# Patient Record
Sex: FEMALE | Race: WHITE | NOT HISPANIC OR LATINO | Employment: OTHER | URBAN - METROPOLITAN AREA
[De-identification: names, ages, dates, MRNs, and addresses within clinical notes are randomized per-mention and may not be internally consistent; named-entity substitution may affect disease eponyms.]

---

## 2017-02-27 ENCOUNTER — ALLSCRIPTS OFFICE VISIT (OUTPATIENT)
Dept: OTHER | Facility: OTHER | Age: 82
End: 2017-02-27

## 2017-04-02 ENCOUNTER — TELEPHONE (OUTPATIENT)
Dept: INPATIENT UNIT | Facility: HOSPITAL | Age: 82
End: 2017-04-02

## 2017-04-03 ENCOUNTER — HOSPITAL ENCOUNTER (OUTPATIENT)
Dept: NON INVASIVE DIAGNOSTICS | Facility: HOSPITAL | Age: 82
Discharge: HOME | End: 2017-04-03
Attending: INTERNAL MEDICINE | Admitting: INTERNAL MEDICINE
Payer: MEDICARE

## 2017-04-03 ENCOUNTER — ANESTHESIA (OUTPATIENT)
Dept: SURGERY | Facility: HOSPITAL | Age: 82
End: 2017-04-03
Payer: MEDICARE

## 2017-04-03 ENCOUNTER — ANESTHESIA EVENT (OUTPATIENT)
Dept: SURGERY | Facility: HOSPITAL | Age: 82
End: 2017-04-03
Payer: MEDICARE

## 2017-04-03 VITALS
SYSTOLIC BLOOD PRESSURE: 166 MMHG | WEIGHT: 105 LBS | BODY MASS INDEX: 20.62 KG/M2 | OXYGEN SATURATION: 96 % | TEMPERATURE: 97.3 F | RESPIRATION RATE: 18 BRPM | HEART RATE: 102 BPM | HEIGHT: 60 IN | DIASTOLIC BLOOD PRESSURE: 74 MMHG

## 2017-04-03 DIAGNOSIS — Z45.010 PACEMAKER GENERATOR END OF LIFE: ICD-10-CM

## 2017-04-03 LAB
ANION GAP SERPL CALCULATED.3IONS-SCNC: 7 MMOL/L (ref 4–13)
ATRIAL RATE: 277 BPM
ATRIAL RATE: 85 BPM
BUN SERPL-MCNC: 37 MG/DL (ref 5–25)
CALCIUM SERPL-MCNC: 9.5 MG/DL (ref 8.3–10.1)
CHLORIDE SERPL-SCNC: 111 MMOL/L (ref 100–108)
CO2 SERPL-SCNC: 23 MMOL/L (ref 21–32)
CREAT SERPL-MCNC: 1.44 MG/DL (ref 0.6–1.3)
ERYTHROCYTE [DISTWIDTH] IN BLOOD BY AUTOMATED COUNT: 15.2 % (ref 11.6–15.1)
GFR SERPL CREATININE-BSD FRML MDRD: 33.5 ML/MIN/1.73SQ M
GLUCOSE P FAST SERPL-MCNC: 98 MG/DL (ref 65–99)
GLUCOSE SERPL-MCNC: 98 MG/DL (ref 65–140)
HCT VFR BLD AUTO: 37.9 % (ref 34.8–46.1)
HGB BLD-MCNC: 12.9 G/DL (ref 11.5–15.4)
INR PPP: 1.08 (ref 0.86–1.16)
MCH RBC QN AUTO: 33.2 PG (ref 26.8–34.3)
MCHC RBC AUTO-ENTMCNC: 34 G/DL (ref 31.4–37.4)
MCV RBC AUTO: 97 FL (ref 82–98)
PLATELET # BLD AUTO: 189 THOUSANDS/UL (ref 149–390)
PMV BLD AUTO: 10.9 FL (ref 8.9–12.7)
POTASSIUM SERPL-SCNC: 4.5 MMOL/L (ref 3.5–5.3)
POTASSIUM SERPL-SCNC: 5.5 MMOL/L (ref 3.5–5.3)
PROTHROMBIN TIME: 14.1 SECONDS (ref 12–14.3)
QRS AXIS: 16 DEGREES
QRS AXIS: 22 DEGREES
QRSD INTERVAL: 88 MS
QRSD INTERVAL: 88 MS
QT INTERVAL: 380 MS
QT INTERVAL: 388 MS
QTC INTERVAL: 459 MS
QTC INTERVAL: 469 MS
RBC # BLD AUTO: 3.89 MILLION/UL (ref 3.81–5.12)
SODIUM SERPL-SCNC: 141 MMOL/L (ref 136–145)
T WAVE AXIS: 257 DEGREES
T WAVE AXIS: 259 DEGREES
VENTRICULAR RATE: 88 BPM
VENTRICULAR RATE: 88 BPM
WBC # BLD AUTO: 4.27 THOUSAND/UL (ref 4.31–10.16)

## 2017-04-03 PROCEDURE — 84132 ASSAY OF SERUM POTASSIUM: CPT | Performed by: PHYSICIAN ASSISTANT

## 2017-04-03 PROCEDURE — 85027 COMPLETE CBC AUTOMATED: CPT | Performed by: PHYSICIAN ASSISTANT

## 2017-04-03 PROCEDURE — 33227 REMOVE&REPLACE PM GEN SINGL: CPT | Performed by: INTERNAL MEDICINE

## 2017-04-03 PROCEDURE — 33228 REMV&REPLC PM GEN DUAL LEAD: CPT | Performed by: INTERNAL MEDICINE

## 2017-04-03 PROCEDURE — 93005 ELECTROCARDIOGRAM TRACING: CPT

## 2017-04-03 PROCEDURE — C1785 PMKR, DUAL, RATE-RESP: HCPCS

## 2017-04-03 PROCEDURE — 85610 PROTHROMBIN TIME: CPT | Performed by: PHYSICIAN ASSISTANT

## 2017-04-03 PROCEDURE — 80048 BASIC METABOLIC PNL TOTAL CA: CPT | Performed by: PHYSICIAN ASSISTANT

## 2017-04-03 RX ORDER — GENTAMICIN SULFATE 40 MG/ML
INJECTION, SOLUTION INTRAMUSCULAR; INTRAVENOUS CODE/TRAUMA/SEDATION MEDICATION
Status: COMPLETED | OUTPATIENT
Start: 2017-04-03 | End: 2017-04-03

## 2017-04-03 RX ORDER — LEVOTHYROXINE SODIUM 0.07 MG/1
75 TABLET ORAL DAILY
COMMUNITY

## 2017-04-03 RX ORDER — TRIAMTERENE AND HYDROCHLOROTHIAZIDE 37.5; 25 MG/1; MG/1
1 CAPSULE ORAL DAILY
COMMUNITY
End: 2017-09-20 | Stop reason: HOSPADM

## 2017-04-03 RX ORDER — AMLODIPINE BESYLATE 5 MG/1
5 TABLET ORAL DAILY
COMMUNITY

## 2017-04-03 RX ORDER — LIDOCAINE HYDROCHLORIDE 10 MG/ML
INJECTION, SOLUTION INFILTRATION; PERINEURAL CODE/TRAUMA/SEDATION MEDICATION
Status: COMPLETED | OUTPATIENT
Start: 2017-04-03 | End: 2017-04-03

## 2017-04-03 RX ORDER — ALLOPURINOL 100 MG/1
100 TABLET ORAL DAILY
COMMUNITY

## 2017-04-03 RX ORDER — PANTOPRAZOLE SODIUM 40 MG/1
40 TABLET, DELAYED RELEASE ORAL DAILY
COMMUNITY

## 2017-04-03 RX ORDER — PROPOFOL 10 MG/ML
INJECTION, EMULSION INTRAVENOUS CONTINUOUS PRN
Status: DISCONTINUED | OUTPATIENT
Start: 2017-04-03 | End: 2017-04-03 | Stop reason: SURG

## 2017-04-03 RX ORDER — LIDOCAINE HYDROCHLORIDE 10 MG/ML
INJECTION, SOLUTION INFILTRATION; PERINEURAL AS NEEDED
Status: DISCONTINUED | OUTPATIENT
Start: 2017-04-03 | End: 2017-04-03 | Stop reason: SURG

## 2017-04-03 RX ORDER — SODIUM CHLORIDE 9 MG/ML
50 INJECTION, SOLUTION INTRAVENOUS CONTINUOUS
Status: DISCONTINUED | OUTPATIENT
Start: 2017-04-03 | End: 2017-04-03 | Stop reason: HOSPADM

## 2017-04-03 RX ORDER — METOPROLOL TARTRATE 50 MG/1
50 TABLET, FILM COATED ORAL 2 TIMES DAILY
COMMUNITY
End: 2017-09-20 | Stop reason: HOSPADM

## 2017-04-03 RX ADMIN — GENTAMICIN SULFATE 80 MG: 40 INJECTION, SOLUTION INTRAMUSCULAR; INTRAVENOUS at 14:58

## 2017-04-03 RX ADMIN — CEFAZOLIN SODIUM 1000 MG: 2 SOLUTION INTRAVENOUS at 14:34

## 2017-04-03 RX ADMIN — PROPOFOL 60 MCG/KG/MIN: 10 INJECTION, EMULSION INTRAVENOUS at 14:35

## 2017-04-03 RX ADMIN — LIDOCAINE HYDROCHLORIDE 50 MG: 10 INJECTION, SOLUTION INFILTRATION; PERINEURAL at 14:35

## 2017-04-03 RX ADMIN — LIDOCAINE HYDROCHLORIDE 18 ML: 10 INJECTION, SOLUTION INFILTRATION; PERINEURAL at 14:50

## 2017-04-03 RX ADMIN — SODIUM CHLORIDE: 0.9 INJECTION, SOLUTION INTRAVENOUS at 13:41

## 2017-04-18 ENCOUNTER — ALLSCRIPTS OFFICE VISIT (OUTPATIENT)
Dept: OTHER | Facility: OTHER | Age: 82
End: 2017-04-18

## 2017-09-12 ENCOUNTER — APPOINTMENT (EMERGENCY)
Dept: RADIOLOGY | Facility: HOSPITAL | Age: 82
DRG: 682 | End: 2017-09-12
Payer: MEDICARE

## 2017-09-12 ENCOUNTER — HOSPITAL ENCOUNTER (INPATIENT)
Facility: HOSPITAL | Age: 82
LOS: 8 days | Discharge: NURSING, REHAB OR LONG-TERM CARE | DRG: 682 | End: 2017-09-20
Attending: EMERGENCY MEDICINE | Admitting: HOSPITALIST
Payer: MEDICARE

## 2017-09-12 DIAGNOSIS — J96.01 ACUTE RESPIRATORY FAILURE WITH HYPOXIA (HCC): ICD-10-CM

## 2017-09-12 DIAGNOSIS — K94.01 BLEEDING FROM COLOSTOMY (HCC): ICD-10-CM

## 2017-09-12 DIAGNOSIS — K94.01 BLEEDING FROM COLOSTOMY STOMA (HCC): ICD-10-CM

## 2017-09-12 DIAGNOSIS — J90 PLEURAL EFFUSION, RIGHT: ICD-10-CM

## 2017-09-12 DIAGNOSIS — N17.9 ACUTE KIDNEY INJURY (HCC): ICD-10-CM

## 2017-09-12 DIAGNOSIS — N39.0 URINARY TRACT INFECTION: Primary | ICD-10-CM

## 2017-09-12 LAB
ABO GROUP BLD: NORMAL
ALBUMIN SERPL BCP-MCNC: 3.1 G/DL (ref 3.5–5)
ALP SERPL-CCNC: 123 U/L (ref 46–116)
ALT SERPL W P-5'-P-CCNC: 26 U/L (ref 12–78)
ANION GAP SERPL CALCULATED.3IONS-SCNC: 7 MMOL/L (ref 4–13)
APTT PPP: 30 SECONDS (ref 23–35)
AST SERPL W P-5'-P-CCNC: 30 U/L (ref 5–45)
BACTERIA UR QL AUTO: ABNORMAL /HPF
BASOPHILS # BLD AUTO: 0.01 THOUSANDS/ΜL (ref 0–0.1)
BASOPHILS NFR BLD AUTO: 0 % (ref 0–1)
BILIRUB SERPL-MCNC: 0.69 MG/DL (ref 0.2–1)
BILIRUB UR QL STRIP: NEGATIVE
BLD GP AB SCN SERPL QL: NEGATIVE
BUN SERPL-MCNC: 43 MG/DL (ref 5–25)
CALCIUM SERPL-MCNC: 9.2 MG/DL (ref 8.3–10.1)
CHLORIDE SERPL-SCNC: 110 MMOL/L (ref 100–108)
CLARITY UR: ABNORMAL
CO2 SERPL-SCNC: 22 MMOL/L (ref 21–32)
COLOR UR: YELLOW
COLOR, POC: NORMAL
CREAT SERPL-MCNC: 1.72 MG/DL (ref 0.6–1.3)
EOSINOPHIL # BLD AUTO: 0.15 THOUSAND/ΜL (ref 0–0.61)
EOSINOPHIL NFR BLD AUTO: 2 % (ref 0–6)
ERYTHROCYTE [DISTWIDTH] IN BLOOD BY AUTOMATED COUNT: 15.3 % (ref 11.6–15.1)
GFR SERPL CREATININE-BSD FRML MDRD: 24 ML/MIN/1.73SQ M
GLUCOSE SERPL-MCNC: 103 MG/DL (ref 65–140)
GLUCOSE UR STRIP-MCNC: NEGATIVE MG/DL
HCT VFR BLD AUTO: 36 % (ref 34.8–46.1)
HGB BLD-MCNC: 12.3 G/DL (ref 11.5–15.4)
HGB UR QL STRIP.AUTO: ABNORMAL
INR PPP: 1.13 (ref 0.86–1.16)
KETONES UR STRIP-MCNC: NEGATIVE MG/DL
LEUKOCYTE ESTERASE UR QL STRIP: ABNORMAL
LYMPHOCYTES # BLD AUTO: 1.5 THOUSANDS/ΜL (ref 0.6–4.47)
LYMPHOCYTES NFR BLD AUTO: 23 % (ref 14–44)
MCH RBC QN AUTO: 33.4 PG (ref 26.8–34.3)
MCHC RBC AUTO-ENTMCNC: 34.2 G/DL (ref 31.4–37.4)
MCV RBC AUTO: 98 FL (ref 82–98)
MONOCYTES # BLD AUTO: 0.66 THOUSAND/ΜL (ref 0.17–1.22)
MONOCYTES NFR BLD AUTO: 10 % (ref 4–12)
NEUTROPHILS # BLD AUTO: 4.26 THOUSANDS/ΜL (ref 1.85–7.62)
NEUTS SEG NFR BLD AUTO: 65 % (ref 43–75)
NITRITE UR QL STRIP: NEGATIVE
NON-SQ EPI CELLS URNS QL MICRO: ABNORMAL /HPF
NRBC BLD AUTO-RTO: 0 /100 WBCS
PH UR STRIP.AUTO: 6 [PH] (ref 4.5–8)
PLATELET # BLD AUTO: 167 THOUSANDS/UL (ref 149–390)
PMV BLD AUTO: 10.8 FL (ref 8.9–12.7)
POTASSIUM SERPL-SCNC: 4.9 MMOL/L (ref 3.5–5.3)
PROT SERPL-MCNC: 7.2 G/DL (ref 6.4–8.2)
PROT UR STRIP-MCNC: >=300 MG/DL
PROTHROMBIN TIME: 14.5 SECONDS (ref 12.1–14.4)
RBC # BLD AUTO: 3.68 MILLION/UL (ref 3.81–5.12)
RBC #/AREA URNS AUTO: ABNORMAL /HPF
RH BLD: POSITIVE
SODIUM SERPL-SCNC: 139 MMOL/L (ref 136–145)
SP GR UR STRIP.AUTO: 1.02 (ref 1–1.03)
SPECIMEN EXPIRATION DATE: NORMAL
SPECIMEN SOURCE: NORMAL
TROPONIN I BLD-MCNC: 0.03 NG/ML (ref 0–0.08)
UROBILINOGEN UR QL STRIP.AUTO: 1 E.U./DL
WBC # BLD AUTO: 6.59 THOUSAND/UL (ref 4.31–10.16)
WBC #/AREA URNS AUTO: ABNORMAL /HPF

## 2017-09-12 PROCEDURE — 84484 ASSAY OF TROPONIN QUANT: CPT

## 2017-09-12 PROCEDURE — 81001 URINALYSIS AUTO W/SCOPE: CPT

## 2017-09-12 PROCEDURE — 81002 URINALYSIS NONAUTO W/O SCOPE: CPT | Performed by: EMERGENCY MEDICINE

## 2017-09-12 PROCEDURE — 86850 RBC ANTIBODY SCREEN: CPT | Performed by: EMERGENCY MEDICINE

## 2017-09-12 PROCEDURE — 87086 URINE CULTURE/COLONY COUNT: CPT

## 2017-09-12 PROCEDURE — 93005 ELECTROCARDIOGRAM TRACING: CPT | Performed by: EMERGENCY MEDICINE

## 2017-09-12 PROCEDURE — 87186 SC STD MICRODIL/AGAR DIL: CPT

## 2017-09-12 PROCEDURE — 96360 HYDRATION IV INFUSION INIT: CPT

## 2017-09-12 PROCEDURE — 86901 BLOOD TYPING SEROLOGIC RH(D): CPT | Performed by: EMERGENCY MEDICINE

## 2017-09-12 PROCEDURE — 36415 COLL VENOUS BLD VENIPUNCTURE: CPT | Performed by: EMERGENCY MEDICINE

## 2017-09-12 PROCEDURE — 85610 PROTHROMBIN TIME: CPT | Performed by: EMERGENCY MEDICINE

## 2017-09-12 PROCEDURE — 80053 COMPREHEN METABOLIC PANEL: CPT | Performed by: EMERGENCY MEDICINE

## 2017-09-12 PROCEDURE — 74176 CT ABD & PELVIS W/O CONTRAST: CPT

## 2017-09-12 PROCEDURE — 85730 THROMBOPLASTIN TIME PARTIAL: CPT | Performed by: EMERGENCY MEDICINE

## 2017-09-12 PROCEDURE — 86900 BLOOD TYPING SEROLOGIC ABO: CPT | Performed by: EMERGENCY MEDICINE

## 2017-09-12 PROCEDURE — 87077 CULTURE AEROBIC IDENTIFY: CPT

## 2017-09-12 PROCEDURE — 85025 COMPLETE CBC W/AUTO DIFF WBC: CPT | Performed by: EMERGENCY MEDICINE

## 2017-09-12 RX ADMIN — CEFTRIAXONE 1000 MG: 1 INJECTION, POWDER, FOR SOLUTION INTRAMUSCULAR; INTRAVENOUS at 23:57

## 2017-09-12 RX ADMIN — SODIUM CHLORIDE 1000 ML: 0.9 INJECTION, SOLUTION INTRAVENOUS at 22:00

## 2017-09-13 PROBLEM — Z95.0 PACEMAKER: Status: ACTIVE | Noted: 2017-09-13

## 2017-09-13 PROBLEM — Z95.0 PACEMAKER: Chronic | Status: ACTIVE | Noted: 2017-09-13

## 2017-09-13 PROBLEM — K94.01 BLEEDING FROM COLOSTOMY (HCC): Status: ACTIVE | Noted: 2017-09-13

## 2017-09-13 PROBLEM — I48.19 ATRIAL FIBRILLATION, PERSISTENT (HCC): Status: ACTIVE | Noted: 2017-09-13

## 2017-09-13 PROBLEM — E86.0 DEHYDRATION: Status: ACTIVE | Noted: 2017-09-13

## 2017-09-13 PROBLEM — I48.19 ATRIAL FIBRILLATION, PERSISTENT (HCC): Chronic | Status: ACTIVE | Noted: 2017-09-13

## 2017-09-13 PROBLEM — N17.9 AKI (ACUTE KIDNEY INJURY) (HCC): Status: ACTIVE | Noted: 2017-09-13

## 2017-09-13 PROBLEM — E03.9 HYPOTHYROIDISM: Status: ACTIVE | Noted: 2017-09-13

## 2017-09-13 PROBLEM — N39.0 UTI (URINARY TRACT INFECTION): Status: ACTIVE | Noted: 2017-09-13

## 2017-09-13 PROBLEM — E03.9 HYPOTHYROIDISM: Chronic | Status: ACTIVE | Noted: 2017-09-13

## 2017-09-13 LAB
ANION GAP SERPL CALCULATED.3IONS-SCNC: 8 MMOL/L (ref 4–13)
ATRIAL RATE: 258 BPM
BUN SERPL-MCNC: 40 MG/DL (ref 5–25)
CALCIUM SERPL-MCNC: 8.7 MG/DL (ref 8.3–10.1)
CHLORIDE SERPL-SCNC: 112 MMOL/L (ref 100–108)
CO2 SERPL-SCNC: 21 MMOL/L (ref 21–32)
CREAT SERPL-MCNC: 1.6 MG/DL (ref 0.6–1.3)
ERYTHROCYTE [DISTWIDTH] IN BLOOD BY AUTOMATED COUNT: 15 % (ref 11.6–15.1)
GFR SERPL CREATININE-BSD FRML MDRD: 26 ML/MIN/1.73SQ M
GLUCOSE SERPL-MCNC: 109 MG/DL (ref 65–140)
HCT VFR BLD AUTO: 36.2 % (ref 34.8–46.1)
HGB BLD-MCNC: 12.2 G/DL (ref 11.5–15.4)
MCH RBC QN AUTO: 33.2 PG (ref 26.8–34.3)
MCHC RBC AUTO-ENTMCNC: 33.7 G/DL (ref 31.4–37.4)
MCV RBC AUTO: 98 FL (ref 82–98)
P AXIS: 255 DEGREES
PLATELET # BLD AUTO: 179 THOUSANDS/UL (ref 149–390)
PMV BLD AUTO: 10.7 FL (ref 8.9–12.7)
POTASSIUM SERPL-SCNC: 4.6 MMOL/L (ref 3.5–5.3)
QRS AXIS: 53 DEGREES
QRSD INTERVAL: 86 MS
QT INTERVAL: 322 MS
QTC INTERVAL: 425 MS
RBC # BLD AUTO: 3.68 MILLION/UL (ref 3.81–5.12)
SODIUM SERPL-SCNC: 141 MMOL/L (ref 136–145)
T WAVE AXIS: 245 DEGREES
VENTRICULAR RATE: 105 BPM
WBC # BLD AUTO: 6.03 THOUSAND/UL (ref 4.31–10.16)

## 2017-09-13 PROCEDURE — 80048 BASIC METABOLIC PNL TOTAL CA: CPT | Performed by: HOSPITALIST

## 2017-09-13 PROCEDURE — 85027 COMPLETE CBC AUTOMATED: CPT | Performed by: HOSPITALIST

## 2017-09-13 PROCEDURE — 99285 EMERGENCY DEPT VISIT HI MDM: CPT

## 2017-09-13 PROCEDURE — 36415 COLL VENOUS BLD VENIPUNCTURE: CPT | Performed by: HOSPITALIST

## 2017-09-13 PROCEDURE — C9113 INJ PANTOPRAZOLE SODIUM, VIA: HCPCS | Performed by: HOSPITALIST

## 2017-09-13 RX ORDER — DOCUSATE SODIUM 100 MG/1
100 CAPSULE, LIQUID FILLED ORAL 2 TIMES DAILY
Status: DISCONTINUED | OUTPATIENT
Start: 2017-09-13 | End: 2017-09-20 | Stop reason: HOSPADM

## 2017-09-13 RX ORDER — ONDANSETRON 2 MG/ML
4 INJECTION INTRAMUSCULAR; INTRAVENOUS EVERY 6 HOURS PRN
Status: DISCONTINUED | OUTPATIENT
Start: 2017-09-13 | End: 2017-09-20 | Stop reason: HOSPADM

## 2017-09-13 RX ORDER — METOPROLOL TARTRATE 50 MG/1
50 TABLET, FILM COATED ORAL 2 TIMES DAILY
Status: DISCONTINUED | OUTPATIENT
Start: 2017-09-13 | End: 2017-09-14

## 2017-09-13 RX ORDER — SENNOSIDES 8.6 MG
1 TABLET ORAL DAILY
Status: DISCONTINUED | OUTPATIENT
Start: 2017-09-13 | End: 2017-09-20 | Stop reason: HOSPADM

## 2017-09-13 RX ORDER — PANTOPRAZOLE SODIUM 40 MG/1
40 INJECTION, POWDER, FOR SOLUTION INTRAVENOUS EVERY 12 HOURS SCHEDULED
Status: DISCONTINUED | OUTPATIENT
Start: 2017-09-13 | End: 2017-09-13

## 2017-09-13 RX ORDER — ALLOPURINOL 100 MG/1
100 TABLET ORAL DAILY
Status: DISCONTINUED | OUTPATIENT
Start: 2017-09-13 | End: 2017-09-20 | Stop reason: HOSPADM

## 2017-09-13 RX ORDER — SODIUM CHLORIDE 9 MG/ML
80 INJECTION, SOLUTION INTRAVENOUS CONTINUOUS
Status: DISPENSED | OUTPATIENT
Start: 2017-09-13 | End: 2017-09-13

## 2017-09-13 RX ORDER — AMLODIPINE BESYLATE 5 MG/1
5 TABLET ORAL DAILY
Status: DISCONTINUED | OUTPATIENT
Start: 2017-09-13 | End: 2017-09-19

## 2017-09-13 RX ORDER — LEVOTHYROXINE SODIUM 0.07 MG/1
75 TABLET ORAL DAILY
Status: DISCONTINUED | OUTPATIENT
Start: 2017-09-13 | End: 2017-09-20 | Stop reason: HOSPADM

## 2017-09-13 RX ORDER — ACETAMINOPHEN 325 MG/1
650 TABLET ORAL EVERY 6 HOURS PRN
Status: DISCONTINUED | OUTPATIENT
Start: 2017-09-13 | End: 2017-09-20 | Stop reason: HOSPADM

## 2017-09-13 RX ORDER — PANTOPRAZOLE SODIUM 40 MG/1
40 TABLET, DELAYED RELEASE ORAL DAILY
Status: DISCONTINUED | OUTPATIENT
Start: 2017-09-13 | End: 2017-09-20 | Stop reason: HOSPADM

## 2017-09-13 RX ADMIN — PANTOPRAZOLE SODIUM 40 MG: 40 TABLET, DELAYED RELEASE ORAL at 08:47

## 2017-09-13 RX ADMIN — PANTOPRAZOLE SODIUM 40 MG: 40 INJECTION, POWDER, FOR SOLUTION INTRAVENOUS at 01:26

## 2017-09-13 RX ADMIN — METOPROLOL TARTRATE 50 MG: 50 TABLET ORAL at 17:42

## 2017-09-13 RX ADMIN — SENNOSIDES 8.6 MG: 8.6 TABLET, FILM COATED ORAL at 08:47

## 2017-09-13 RX ADMIN — SODIUM CHLORIDE 80 ML/HR: 0.9 INJECTION, SOLUTION INTRAVENOUS at 01:26

## 2017-09-13 RX ADMIN — LEVOTHYROXINE SODIUM 75 MCG: 75 TABLET ORAL at 05:23

## 2017-09-13 RX ADMIN — AMLODIPINE BESYLATE 5 MG: 5 TABLET ORAL at 08:47

## 2017-09-13 RX ADMIN — DOCUSATE SODIUM 100 MG: 100 CAPSULE, LIQUID FILLED ORAL at 08:47

## 2017-09-13 RX ADMIN — METOPROLOL TARTRATE 50 MG: 50 TABLET ORAL at 08:47

## 2017-09-13 RX ADMIN — ALLOPURINOL 100 MG: 100 TABLET ORAL at 08:47

## 2017-09-14 PROBLEM — E86.0 DEHYDRATION: Status: RESOLVED | Noted: 2017-09-13 | Resolved: 2017-09-14

## 2017-09-14 PROBLEM — N18.30 STAGE 3 CHRONIC KIDNEY DISEASE (HCC): Status: ACTIVE | Noted: 2017-09-14

## 2017-09-14 PROBLEM — I10 ESSENTIAL HYPERTENSION: Status: ACTIVE | Noted: 2017-09-14

## 2017-09-14 LAB
ANION GAP SERPL CALCULATED.3IONS-SCNC: 9 MMOL/L (ref 4–13)
BASOPHILS # BLD AUTO: 0.02 THOUSANDS/ΜL (ref 0–0.1)
BASOPHILS NFR BLD AUTO: 0 % (ref 0–1)
BUN SERPL-MCNC: 37 MG/DL (ref 5–25)
CALCIUM SERPL-MCNC: 8.8 MG/DL (ref 8.3–10.1)
CHLORIDE SERPL-SCNC: 110 MMOL/L (ref 100–108)
CO2 SERPL-SCNC: 20 MMOL/L (ref 21–32)
CREAT SERPL-MCNC: 1.61 MG/DL (ref 0.6–1.3)
EOSINOPHIL # BLD AUTO: 0.22 THOUSAND/ΜL (ref 0–0.61)
EOSINOPHIL NFR BLD AUTO: 4 % (ref 0–6)
ERYTHROCYTE [DISTWIDTH] IN BLOOD BY AUTOMATED COUNT: 15.4 % (ref 11.6–15.1)
GFR SERPL CREATININE-BSD FRML MDRD: 26 ML/MIN/1.73SQ M
GLUCOSE SERPL-MCNC: 103 MG/DL (ref 65–140)
HCT VFR BLD AUTO: 35.1 % (ref 34.8–46.1)
HGB BLD-MCNC: 11.9 G/DL (ref 11.5–15.4)
LYMPHOCYTES # BLD AUTO: 1.19 THOUSANDS/ΜL (ref 0.6–4.47)
LYMPHOCYTES NFR BLD AUTO: 21 % (ref 14–44)
MAGNESIUM SERPL-MCNC: 2.1 MG/DL (ref 1.6–2.6)
MCH RBC QN AUTO: 33 PG (ref 26.8–34.3)
MCHC RBC AUTO-ENTMCNC: 33.9 G/DL (ref 31.4–37.4)
MCV RBC AUTO: 97 FL (ref 82–98)
MONOCYTES # BLD AUTO: 0.53 THOUSAND/ΜL (ref 0.17–1.22)
MONOCYTES NFR BLD AUTO: 9 % (ref 4–12)
NEUTROPHILS # BLD AUTO: 3.76 THOUSANDS/ΜL (ref 1.85–7.62)
NEUTS SEG NFR BLD AUTO: 66 % (ref 43–75)
NRBC BLD AUTO-RTO: 0 /100 WBCS
PLATELET # BLD AUTO: 156 THOUSANDS/UL (ref 149–390)
PMV BLD AUTO: 10.4 FL (ref 8.9–12.7)
POTASSIUM SERPL-SCNC: 4.1 MMOL/L (ref 3.5–5.3)
RBC # BLD AUTO: 3.61 MILLION/UL (ref 3.81–5.12)
SODIUM SERPL-SCNC: 139 MMOL/L (ref 136–145)
WBC # BLD AUTO: 5.73 THOUSAND/UL (ref 4.31–10.16)

## 2017-09-14 PROCEDURE — G8988 SELF CARE GOAL STATUS: HCPCS

## 2017-09-14 PROCEDURE — 97535 SELF CARE MNGMENT TRAINING: CPT

## 2017-09-14 PROCEDURE — G8979 MOBILITY GOAL STATUS: HCPCS

## 2017-09-14 PROCEDURE — 97166 OT EVAL MOD COMPLEX 45 MIN: CPT

## 2017-09-14 PROCEDURE — G8980 MOBILITY D/C STATUS: HCPCS

## 2017-09-14 PROCEDURE — 97163 PT EVAL HIGH COMPLEX 45 MIN: CPT

## 2017-09-14 PROCEDURE — 85025 COMPLETE CBC W/AUTO DIFF WBC: CPT | Performed by: PHYSICIAN ASSISTANT

## 2017-09-14 PROCEDURE — 83735 ASSAY OF MAGNESIUM: CPT | Performed by: NURSE PRACTITIONER

## 2017-09-14 PROCEDURE — 80048 BASIC METABOLIC PNL TOTAL CA: CPT | Performed by: PHYSICIAN ASSISTANT

## 2017-09-14 PROCEDURE — G8987 SELF CARE CURRENT STATUS: HCPCS

## 2017-09-14 RX ORDER — TRIAMTERENE AND HYDROCHLOROTHIAZIDE 37.5; 25 MG/1; MG/1
1 TABLET ORAL DAILY
Status: DISCONTINUED | OUTPATIENT
Start: 2017-09-14 | End: 2017-09-18

## 2017-09-14 RX ORDER — METOPROLOL TARTRATE 5 MG/5ML
5 INJECTION INTRAVENOUS EVERY 6 HOURS PRN
Status: DISCONTINUED | OUTPATIENT
Start: 2017-09-14 | End: 2017-09-20 | Stop reason: HOSPADM

## 2017-09-14 RX ADMIN — METOPROLOL TARTRATE 50 MG: 50 TABLET ORAL at 06:20

## 2017-09-14 RX ADMIN — METOPROLOL TARTRATE 5 MG: 5 INJECTION, SOLUTION INTRAVENOUS at 21:00

## 2017-09-14 RX ADMIN — PANTOPRAZOLE SODIUM 40 MG: 40 TABLET, DELAYED RELEASE ORAL at 10:58

## 2017-09-14 RX ADMIN — DOCUSATE SODIUM 100 MG: 100 CAPSULE, LIQUID FILLED ORAL at 19:22

## 2017-09-14 RX ADMIN — SENNOSIDES 8.6 MG: 8.6 TABLET, FILM COATED ORAL at 10:58

## 2017-09-14 RX ADMIN — LEVOTHYROXINE SODIUM 75 MCG: 75 TABLET ORAL at 06:19

## 2017-09-14 RX ADMIN — CEFTRIAXONE SODIUM 1000 MG: 10 INJECTION, POWDER, FOR SOLUTION INTRAVENOUS at 15:29

## 2017-09-14 RX ADMIN — ALLOPURINOL 100 MG: 100 TABLET ORAL at 10:58

## 2017-09-14 RX ADMIN — TRIAMTERENE AND HYDROCHLOROTHIAZIDE 1 TABLET: 37.5; 25 TABLET ORAL at 15:24

## 2017-09-14 RX ADMIN — METOPROLOL TARTRATE 75 MG: 50 TABLET ORAL at 19:22

## 2017-09-14 RX ADMIN — AMLODIPINE BESYLATE 5 MG: 5 TABLET ORAL at 06:20

## 2017-09-15 PROBLEM — J96.01 ACUTE RESPIRATORY FAILURE WITH HYPOXIA (HCC): Status: ACTIVE | Noted: 2017-09-15

## 2017-09-15 PROBLEM — K94.01 BLEEDING FROM COLOSTOMY (HCC): Status: RESOLVED | Noted: 2017-09-13 | Resolved: 2017-09-15

## 2017-09-15 PROBLEM — J90 PLEURAL EFFUSION, RIGHT: Status: ACTIVE | Noted: 2017-09-15

## 2017-09-15 LAB
ANION GAP SERPL CALCULATED.3IONS-SCNC: 10 MMOL/L (ref 4–13)
BACTERIA UR CULT: NORMAL
BASOPHILS # BLD AUTO: 0.01 THOUSANDS/ΜL (ref 0–0.1)
BASOPHILS NFR BLD AUTO: 0 % (ref 0–1)
BUN SERPL-MCNC: 38 MG/DL (ref 5–25)
CALCIUM SERPL-MCNC: 8.7 MG/DL (ref 8.3–10.1)
CHLORIDE SERPL-SCNC: 106 MMOL/L (ref 100–108)
CO2 SERPL-SCNC: 19 MMOL/L (ref 21–32)
CREAT SERPL-MCNC: 1.77 MG/DL (ref 0.6–1.3)
EOSINOPHIL # BLD AUTO: 0.1 THOUSAND/ΜL (ref 0–0.61)
EOSINOPHIL NFR BLD AUTO: 2 % (ref 0–6)
ERYTHROCYTE [DISTWIDTH] IN BLOOD BY AUTOMATED COUNT: 15 % (ref 11.6–15.1)
GFR SERPL CREATININE-BSD FRML MDRD: 23 ML/MIN/1.73SQ M
GLUCOSE SERPL-MCNC: 110 MG/DL (ref 65–140)
HCT VFR BLD AUTO: 36.5 % (ref 34.8–46.1)
HGB BLD-MCNC: 12.4 G/DL (ref 11.5–15.4)
LYMPHOCYTES # BLD AUTO: 1.13 THOUSANDS/ΜL (ref 0.6–4.47)
LYMPHOCYTES NFR BLD AUTO: 21 % (ref 14–44)
MCH RBC QN AUTO: 32.9 PG (ref 26.8–34.3)
MCHC RBC AUTO-ENTMCNC: 34 G/DL (ref 31.4–37.4)
MCV RBC AUTO: 97 FL (ref 82–98)
MONOCYTES # BLD AUTO: 0.47 THOUSAND/ΜL (ref 0.17–1.22)
MONOCYTES NFR BLD AUTO: 9 % (ref 4–12)
NEUTROPHILS # BLD AUTO: 3.66 THOUSANDS/ΜL (ref 1.85–7.62)
NEUTS SEG NFR BLD AUTO: 68 % (ref 43–75)
NRBC BLD AUTO-RTO: 0 /100 WBCS
PLATELET # BLD AUTO: 169 THOUSANDS/UL (ref 149–390)
PMV BLD AUTO: 10.7 FL (ref 8.9–12.7)
POTASSIUM SERPL-SCNC: 4.6 MMOL/L (ref 3.5–5.3)
RBC # BLD AUTO: 3.77 MILLION/UL (ref 3.81–5.12)
SODIUM SERPL-SCNC: 135 MMOL/L (ref 136–145)
WBC # BLD AUTO: 5.4 THOUSAND/UL (ref 4.31–10.16)

## 2017-09-15 PROCEDURE — 85025 COMPLETE CBC W/AUTO DIFF WBC: CPT | Performed by: PHYSICIAN ASSISTANT

## 2017-09-15 PROCEDURE — 80048 BASIC METABOLIC PNL TOTAL CA: CPT | Performed by: PHYSICIAN ASSISTANT

## 2017-09-15 RX ADMIN — CEFTRIAXONE SODIUM 1000 MG: 10 INJECTION, POWDER, FOR SOLUTION INTRAVENOUS at 14:28

## 2017-09-15 RX ADMIN — PANTOPRAZOLE SODIUM 40 MG: 40 TABLET, DELAYED RELEASE ORAL at 09:56

## 2017-09-15 RX ADMIN — METOPROLOL TARTRATE 75 MG: 50 TABLET ORAL at 09:56

## 2017-09-15 RX ADMIN — TRIAMTERENE AND HYDROCHLOROTHIAZIDE 1 TABLET: 37.5; 25 TABLET ORAL at 09:55

## 2017-09-15 RX ADMIN — METOPROLOL TARTRATE 75 MG: 50 TABLET ORAL at 17:51

## 2017-09-15 RX ADMIN — SENNOSIDES 8.6 MG: 8.6 TABLET, FILM COATED ORAL at 09:56

## 2017-09-15 RX ADMIN — LEVOTHYROXINE SODIUM 75 MCG: 75 TABLET ORAL at 06:04

## 2017-09-15 RX ADMIN — DOCUSATE SODIUM 100 MG: 100 CAPSULE, LIQUID FILLED ORAL at 09:56

## 2017-09-15 RX ADMIN — ALLOPURINOL 100 MG: 100 TABLET ORAL at 09:56

## 2017-09-15 RX ADMIN — AMLODIPINE BESYLATE 5 MG: 5 TABLET ORAL at 09:56

## 2017-09-16 ENCOUNTER — APPOINTMENT (INPATIENT)
Dept: NON INVASIVE DIAGNOSTICS | Facility: HOSPITAL | Age: 82
DRG: 682 | End: 2017-09-16
Payer: MEDICARE

## 2017-09-16 LAB
ANION GAP SERPL CALCULATED.3IONS-SCNC: 11 MMOL/L (ref 4–13)
BASOPHILS # BLD AUTO: 0.01 THOUSANDS/ΜL (ref 0–0.1)
BASOPHILS NFR BLD AUTO: 0 % (ref 0–1)
BUN SERPL-MCNC: 40 MG/DL (ref 5–25)
CALCIUM SERPL-MCNC: 9.2 MG/DL (ref 8.3–10.1)
CHLORIDE SERPL-SCNC: 102 MMOL/L (ref 100–108)
CO2 SERPL-SCNC: 20 MMOL/L (ref 21–32)
CREAT SERPL-MCNC: 1.76 MG/DL (ref 0.6–1.3)
EOSINOPHIL # BLD AUTO: 0.15 THOUSAND/ΜL (ref 0–0.61)
EOSINOPHIL NFR BLD AUTO: 3 % (ref 0–6)
ERYTHROCYTE [DISTWIDTH] IN BLOOD BY AUTOMATED COUNT: 15.2 % (ref 11.6–15.1)
GFR SERPL CREATININE-BSD FRML MDRD: 23 ML/MIN/1.73SQ M
GLUCOSE SERPL-MCNC: 102 MG/DL (ref 65–140)
HCT VFR BLD AUTO: 37.2 % (ref 34.8–46.1)
HGB BLD-MCNC: 12.8 G/DL (ref 11.5–15.4)
LYMPHOCYTES # BLD AUTO: 1.8 THOUSANDS/ΜL (ref 0.6–4.47)
LYMPHOCYTES NFR BLD AUTO: 30 % (ref 14–44)
MCH RBC QN AUTO: 33.2 PG (ref 26.8–34.3)
MCHC RBC AUTO-ENTMCNC: 34.4 G/DL (ref 31.4–37.4)
MCV RBC AUTO: 96 FL (ref 82–98)
MONOCYTES # BLD AUTO: 0.72 THOUSAND/ΜL (ref 0.17–1.22)
MONOCYTES NFR BLD AUTO: 12 % (ref 4–12)
NEUTROPHILS # BLD AUTO: 3.26 THOUSANDS/ΜL (ref 1.85–7.62)
NEUTS SEG NFR BLD AUTO: 55 % (ref 43–75)
NRBC BLD AUTO-RTO: 0 /100 WBCS
PLATELET # BLD AUTO: 187 THOUSANDS/UL (ref 149–390)
PMV BLD AUTO: 11.2 FL (ref 8.9–12.7)
POTASSIUM SERPL-SCNC: 4.8 MMOL/L (ref 3.5–5.3)
RBC # BLD AUTO: 3.86 MILLION/UL (ref 3.81–5.12)
SODIUM SERPL-SCNC: 133 MMOL/L (ref 136–145)
WBC # BLD AUTO: 5.95 THOUSAND/UL (ref 4.31–10.16)

## 2017-09-16 PROCEDURE — 85025 COMPLETE CBC W/AUTO DIFF WBC: CPT | Performed by: PHYSICIAN ASSISTANT

## 2017-09-16 PROCEDURE — 80048 BASIC METABOLIC PNL TOTAL CA: CPT | Performed by: PHYSICIAN ASSISTANT

## 2017-09-16 PROCEDURE — 93306 TTE W/DOPPLER COMPLETE: CPT

## 2017-09-16 RX ADMIN — SENNOSIDES 8.6 MG: 8.6 TABLET, FILM COATED ORAL at 10:06

## 2017-09-16 RX ADMIN — METOPROLOL TARTRATE 75 MG: 50 TABLET ORAL at 10:06

## 2017-09-16 RX ADMIN — TRIAMTERENE AND HYDROCHLOROTHIAZIDE 1 TABLET: 37.5; 25 TABLET ORAL at 10:07

## 2017-09-16 RX ADMIN — CEFTRIAXONE SODIUM 1000 MG: 10 INJECTION, POWDER, FOR SOLUTION INTRAVENOUS at 16:34

## 2017-09-16 RX ADMIN — PANTOPRAZOLE SODIUM 40 MG: 40 TABLET, DELAYED RELEASE ORAL at 10:06

## 2017-09-16 RX ADMIN — METOPROLOL TARTRATE 75 MG: 50 TABLET ORAL at 18:22

## 2017-09-16 RX ADMIN — AMLODIPINE BESYLATE 5 MG: 5 TABLET ORAL at 10:06

## 2017-09-16 RX ADMIN — ALLOPURINOL 100 MG: 100 TABLET ORAL at 10:06

## 2017-09-16 RX ADMIN — LEVOTHYROXINE SODIUM 75 MCG: 75 TABLET ORAL at 05:23

## 2017-09-17 RX ORDER — METOPROLOL TARTRATE 50 MG/1
100 TABLET, FILM COATED ORAL 2 TIMES DAILY
Status: DISCONTINUED | OUTPATIENT
Start: 2017-09-17 | End: 2017-09-19

## 2017-09-17 RX ADMIN — TRIAMTERENE AND HYDROCHLOROTHIAZIDE 1 TABLET: 37.5; 25 TABLET ORAL at 10:46

## 2017-09-17 RX ADMIN — CEFTRIAXONE SODIUM 1000 MG: 10 INJECTION, POWDER, FOR SOLUTION INTRAVENOUS at 13:00

## 2017-09-17 RX ADMIN — SENNOSIDES 8.6 MG: 8.6 TABLET, FILM COATED ORAL at 08:04

## 2017-09-17 RX ADMIN — ALLOPURINOL 100 MG: 100 TABLET ORAL at 08:04

## 2017-09-17 RX ADMIN — METOPROLOL TARTRATE 100 MG: 50 TABLET ORAL at 17:10

## 2017-09-17 RX ADMIN — LEVOTHYROXINE SODIUM 75 MCG: 75 TABLET ORAL at 06:19

## 2017-09-17 RX ADMIN — AMLODIPINE BESYLATE 5 MG: 5 TABLET ORAL at 08:04

## 2017-09-17 RX ADMIN — METOPROLOL TARTRATE 75 MG: 50 TABLET ORAL at 08:03

## 2017-09-17 RX ADMIN — PANTOPRAZOLE SODIUM 40 MG: 40 TABLET, DELAYED RELEASE ORAL at 08:03

## 2017-09-18 PROBLEM — E87.5 HYPERKALEMIA: Status: ACTIVE | Noted: 2017-09-18

## 2017-09-18 LAB
ANION GAP SERPL CALCULATED.3IONS-SCNC: 13 MMOL/L (ref 4–13)
ANION GAP SERPL CALCULATED.3IONS-SCNC: 7 MMOL/L (ref 4–13)
ANION GAP SERPL CALCULATED.3IONS-SCNC: 9 MMOL/L (ref 4–13)
BASOPHILS # BLD AUTO: 0.02 THOUSANDS/ΜL (ref 0–0.1)
BASOPHILS NFR BLD AUTO: 1 % (ref 0–1)
BUN SERPL-MCNC: 47 MG/DL (ref 5–25)
BUN SERPL-MCNC: 47 MG/DL (ref 5–25)
BUN SERPL-MCNC: 49 MG/DL (ref 5–25)
CALCIUM SERPL-MCNC: 8.5 MG/DL (ref 8.3–10.1)
CALCIUM SERPL-MCNC: 8.6 MG/DL (ref 8.3–10.1)
CALCIUM SERPL-MCNC: 9.1 MG/DL (ref 8.3–10.1)
CHLORIDE SERPL-SCNC: 103 MMOL/L (ref 100–108)
CHLORIDE SERPL-SCNC: 104 MMOL/L (ref 100–108)
CHLORIDE SERPL-SCNC: 105 MMOL/L (ref 100–108)
CO2 SERPL-SCNC: 19 MMOL/L (ref 21–32)
CO2 SERPL-SCNC: 19 MMOL/L (ref 21–32)
CO2 SERPL-SCNC: 21 MMOL/L (ref 21–32)
CREAT SERPL-MCNC: 1.94 MG/DL (ref 0.6–1.3)
CREAT SERPL-MCNC: 1.96 MG/DL (ref 0.6–1.3)
CREAT SERPL-MCNC: 2.02 MG/DL (ref 0.6–1.3)
EOSINOPHIL # BLD AUTO: 0.31 THOUSAND/ΜL (ref 0–0.61)
EOSINOPHIL NFR BLD AUTO: 7 % (ref 0–6)
ERYTHROCYTE [DISTWIDTH] IN BLOOD BY AUTOMATED COUNT: 14.5 % (ref 11.6–15.1)
GFR SERPL CREATININE-BSD FRML MDRD: 20 ML/MIN/1.73SQ M
GFR SERPL CREATININE-BSD FRML MDRD: 21 ML/MIN/1.73SQ M
GFR SERPL CREATININE-BSD FRML MDRD: 21 ML/MIN/1.73SQ M
GLUCOSE SERPL-MCNC: 106 MG/DL (ref 65–140)
GLUCOSE SERPL-MCNC: 152 MG/DL (ref 65–140)
GLUCOSE SERPL-MCNC: 94 MG/DL (ref 65–140)
HCT VFR BLD AUTO: 36.6 % (ref 34.8–46.1)
HGB BLD-MCNC: 12.6 G/DL (ref 11.5–15.4)
LYMPHOCYTES # BLD AUTO: 1.54 THOUSANDS/ΜL (ref 0.6–4.47)
LYMPHOCYTES NFR BLD AUTO: 37 % (ref 14–44)
MCH RBC QN AUTO: 32.9 PG (ref 26.8–34.3)
MCHC RBC AUTO-ENTMCNC: 34.4 G/DL (ref 31.4–37.4)
MCV RBC AUTO: 96 FL (ref 82–98)
MONOCYTES # BLD AUTO: 0.47 THOUSAND/ΜL (ref 0.17–1.22)
MONOCYTES NFR BLD AUTO: 11 % (ref 4–12)
NEUTROPHILS # BLD AUTO: 1.84 THOUSANDS/ΜL (ref 1.85–7.62)
NEUTS SEG NFR BLD AUTO: 44 % (ref 43–75)
NRBC BLD AUTO-RTO: 0 /100 WBCS
PLATELET # BLD AUTO: 199 THOUSANDS/UL (ref 149–390)
PMV BLD AUTO: 10.6 FL (ref 8.9–12.7)
POTASSIUM SERPL-SCNC: 3.9 MMOL/L (ref 3.5–5.3)
POTASSIUM SERPL-SCNC: 4.2 MMOL/L (ref 3.5–5.3)
POTASSIUM SERPL-SCNC: 6.3 MMOL/L (ref 3.5–5.3)
RBC # BLD AUTO: 3.83 MILLION/UL (ref 3.81–5.12)
SODIUM SERPL-SCNC: 131 MMOL/L (ref 136–145)
SODIUM SERPL-SCNC: 133 MMOL/L (ref 136–145)
SODIUM SERPL-SCNC: 136 MMOL/L (ref 136–145)
WBC # BLD AUTO: 4.19 THOUSAND/UL (ref 4.31–10.16)

## 2017-09-18 PROCEDURE — 85025 COMPLETE CBC W/AUTO DIFF WBC: CPT | Performed by: INTERNAL MEDICINE

## 2017-09-18 PROCEDURE — 80048 BASIC METABOLIC PNL TOTAL CA: CPT | Performed by: INTERNAL MEDICINE

## 2017-09-18 RX ORDER — FUROSEMIDE 10 MG/ML
20 INJECTION INTRAMUSCULAR; INTRAVENOUS ONCE
Status: COMPLETED | OUTPATIENT
Start: 2017-09-18 | End: 2017-09-18

## 2017-09-18 RX ORDER — SODIUM POLYSTYRENE SULFONATE 15 G/60ML
15 SUSPENSION ORAL; RECTAL ONCE
Status: COMPLETED | OUTPATIENT
Start: 2017-09-18 | End: 2017-09-18

## 2017-09-18 RX ADMIN — SENNOSIDES 8.6 MG: 8.6 TABLET, FILM COATED ORAL at 09:46

## 2017-09-18 RX ADMIN — DOCUSATE SODIUM 100 MG: 100 CAPSULE, LIQUID FILLED ORAL at 09:46

## 2017-09-18 RX ADMIN — METOPROLOL TARTRATE 100 MG: 50 TABLET ORAL at 09:46

## 2017-09-18 RX ADMIN — ALLOPURINOL 100 MG: 100 TABLET ORAL at 09:46

## 2017-09-18 RX ADMIN — PANTOPRAZOLE SODIUM 40 MG: 40 TABLET, DELAYED RELEASE ORAL at 09:46

## 2017-09-18 RX ADMIN — CALCIUM GLUCONATE 1 G: 94 INJECTION, SOLUTION INTRAVENOUS at 11:21

## 2017-09-18 RX ADMIN — SODIUM POLYSTYRENE SULFONATE 15 G: 15 SUSPENSION ORAL; RECTAL at 11:21

## 2017-09-18 RX ADMIN — FUROSEMIDE 20 MG: 10 INJECTION, SOLUTION INTRAMUSCULAR; INTRAVENOUS at 21:22

## 2017-09-18 RX ADMIN — LEVOTHYROXINE SODIUM 75 MCG: 75 TABLET ORAL at 06:13

## 2017-09-18 RX ADMIN — FUROSEMIDE 20 MG: 10 INJECTION, SOLUTION INTRAMUSCULAR; INTRAVENOUS at 11:21

## 2017-09-18 RX ADMIN — CEFTRIAXONE SODIUM 1000 MG: 10 INJECTION, POWDER, FOR SOLUTION INTRAVENOUS at 13:07

## 2017-09-18 RX ADMIN — AMLODIPINE BESYLATE 5 MG: 5 TABLET ORAL at 09:46

## 2017-09-18 RX ADMIN — TRIAMTERENE AND HYDROCHLOROTHIAZIDE 1 TABLET: 37.5; 25 TABLET ORAL at 09:46

## 2017-09-18 RX ADMIN — DOCUSATE SODIUM 100 MG: 100 CAPSULE, LIQUID FILLED ORAL at 17:46

## 2017-09-18 RX ADMIN — METOPROLOL TARTRATE 100 MG: 50 TABLET ORAL at 17:46

## 2017-09-19 LAB
ANION GAP SERPL CALCULATED.3IONS-SCNC: 10 MMOL/L (ref 4–13)
BUN SERPL-MCNC: 47 MG/DL (ref 5–25)
CALCIUM SERPL-MCNC: 9 MG/DL (ref 8.3–10.1)
CHLORIDE SERPL-SCNC: 104 MMOL/L (ref 100–108)
CO2 SERPL-SCNC: 21 MMOL/L (ref 21–32)
CREAT SERPL-MCNC: 1.79 MG/DL (ref 0.6–1.3)
GFR SERPL CREATININE-BSD FRML MDRD: 23 ML/MIN/1.73SQ M
GLUCOSE SERPL-MCNC: 102 MG/DL (ref 65–140)
POTASSIUM SERPL-SCNC: 3.7 MMOL/L (ref 3.5–5.3)
SODIUM SERPL-SCNC: 135 MMOL/L (ref 136–145)

## 2017-09-19 PROCEDURE — 97535 SELF CARE MNGMENT TRAINING: CPT

## 2017-09-19 PROCEDURE — 80048 BASIC METABOLIC PNL TOTAL CA: CPT | Performed by: INTERNAL MEDICINE

## 2017-09-19 RX ORDER — FUROSEMIDE 20 MG/1
20 TABLET ORAL DAILY
Status: DISCONTINUED | OUTPATIENT
Start: 2017-09-19 | End: 2017-09-20 | Stop reason: HOSPADM

## 2017-09-19 RX ORDER — POTASSIUM CHLORIDE 20 MEQ/1
40 TABLET, EXTENDED RELEASE ORAL DAILY
Status: DISCONTINUED | OUTPATIENT
Start: 2017-09-19 | End: 2017-09-20

## 2017-09-19 RX ORDER — METOPROLOL TARTRATE 50 MG/1
100 TABLET, FILM COATED ORAL 2 TIMES DAILY
Status: DISCONTINUED | OUTPATIENT
Start: 2017-09-19 | End: 2017-09-20 | Stop reason: HOSPADM

## 2017-09-19 RX ORDER — HEPARIN SODIUM 5000 [USP'U]/ML
5000 INJECTION, SOLUTION INTRAVENOUS; SUBCUTANEOUS EVERY 12 HOURS SCHEDULED
Status: DISCONTINUED | OUTPATIENT
Start: 2017-09-19 | End: 2017-09-20 | Stop reason: HOSPADM

## 2017-09-19 RX ORDER — AMLODIPINE BESYLATE 5 MG/1
5 TABLET ORAL DAILY
Status: DISCONTINUED | OUTPATIENT
Start: 2017-09-20 | End: 2017-09-20 | Stop reason: HOSPADM

## 2017-09-19 RX ADMIN — CEFTRIAXONE SODIUM 1000 MG: 10 INJECTION, POWDER, FOR SOLUTION INTRAVENOUS at 13:29

## 2017-09-19 RX ADMIN — METOPROLOL TARTRATE 100 MG: 50 TABLET ORAL at 09:43

## 2017-09-19 RX ADMIN — SENNOSIDES 8.6 MG: 8.6 TABLET, FILM COATED ORAL at 09:43

## 2017-09-19 RX ADMIN — LEVOTHYROXINE SODIUM 75 MCG: 75 TABLET ORAL at 06:31

## 2017-09-19 RX ADMIN — DOCUSATE SODIUM 100 MG: 100 CAPSULE, LIQUID FILLED ORAL at 17:39

## 2017-09-19 RX ADMIN — FUROSEMIDE 20 MG: 20 TABLET ORAL at 13:29

## 2017-09-19 RX ADMIN — ALLOPURINOL 100 MG: 100 TABLET ORAL at 09:44

## 2017-09-19 RX ADMIN — POTASSIUM CHLORIDE 40 MEQ: 1500 TABLET, EXTENDED RELEASE ORAL at 13:29

## 2017-09-19 RX ADMIN — HEPARIN SODIUM 5000 UNITS: 5000 INJECTION, SOLUTION INTRAVENOUS; SUBCUTANEOUS at 13:29

## 2017-09-19 RX ADMIN — METOPROLOL TARTRATE 100 MG: 50 TABLET ORAL at 17:39

## 2017-09-19 RX ADMIN — AMLODIPINE BESYLATE 5 MG: 5 TABLET ORAL at 09:44

## 2017-09-19 RX ADMIN — PANTOPRAZOLE SODIUM 40 MG: 40 TABLET, DELAYED RELEASE ORAL at 09:43

## 2017-09-19 RX ADMIN — DOCUSATE SODIUM 100 MG: 100 CAPSULE, LIQUID FILLED ORAL at 09:43

## 2017-09-19 RX ADMIN — HEPARIN SODIUM 5000 UNITS: 5000 INJECTION, SOLUTION INTRAVENOUS; SUBCUTANEOUS at 20:52

## 2017-09-20 VITALS
WEIGHT: 120 LBS | BODY MASS INDEX: 23.56 KG/M2 | TEMPERATURE: 97.6 F | OXYGEN SATURATION: 97 % | DIASTOLIC BLOOD PRESSURE: 65 MMHG | RESPIRATION RATE: 20 BRPM | SYSTOLIC BLOOD PRESSURE: 134 MMHG | HEIGHT: 60 IN | HEART RATE: 67 BPM

## 2017-09-20 LAB
ANION GAP SERPL CALCULATED.3IONS-SCNC: 9 MMOL/L (ref 4–13)
BUN SERPL-MCNC: 49 MG/DL (ref 5–25)
CALCIUM SERPL-MCNC: 8.8 MG/DL (ref 8.3–10.1)
CHLORIDE SERPL-SCNC: 105 MMOL/L (ref 100–108)
CO2 SERPL-SCNC: 23 MMOL/L (ref 21–32)
CREAT SERPL-MCNC: 1.83 MG/DL (ref 0.6–1.3)
GFR SERPL CREATININE-BSD FRML MDRD: 22 ML/MIN/1.73SQ M
GLUCOSE SERPL-MCNC: 135 MG/DL (ref 65–140)
POTASSIUM SERPL-SCNC: 4.5 MMOL/L (ref 3.5–5.3)
SODIUM SERPL-SCNC: 137 MMOL/L (ref 136–145)

## 2017-09-20 PROCEDURE — 80048 BASIC METABOLIC PNL TOTAL CA: CPT | Performed by: INTERNAL MEDICINE

## 2017-09-20 RX ORDER — POTASSIUM CHLORIDE 750 MG/1
10 TABLET, EXTENDED RELEASE ORAL DAILY
Status: DISCONTINUED | OUTPATIENT
Start: 2017-09-21 | End: 2017-09-20 | Stop reason: HOSPADM

## 2017-09-20 RX ORDER — METOPROLOL TARTRATE 100 MG/1
100 TABLET ORAL EVERY 12 HOURS SCHEDULED
Qty: 60 TABLET | Refills: 0 | Status: SHIPPED | OUTPATIENT
Start: 2017-09-20

## 2017-09-20 RX ORDER — POTASSIUM CHLORIDE 750 MG/1
10 TABLET, EXTENDED RELEASE ORAL DAILY
Qty: 20 TABLET | Refills: 0 | Status: ON HOLD | OUTPATIENT
Start: 2017-09-21 | End: 2017-11-13

## 2017-09-20 RX ORDER — FUROSEMIDE 20 MG/1
20 TABLET ORAL DAILY
Qty: 30 TABLET | Refills: 0 | Status: ON HOLD | OUTPATIENT
Start: 2017-09-20 | End: 2017-11-13

## 2017-09-20 RX ADMIN — LEVOTHYROXINE SODIUM 75 MCG: 75 TABLET ORAL at 05:40

## 2017-09-20 RX ADMIN — PANTOPRAZOLE SODIUM 40 MG: 40 TABLET, DELAYED RELEASE ORAL at 10:33

## 2017-09-20 RX ADMIN — DOCUSATE SODIUM 100 MG: 100 CAPSULE, LIQUID FILLED ORAL at 10:34

## 2017-09-20 RX ADMIN — METOPROLOL TARTRATE 100 MG: 50 TABLET ORAL at 10:34

## 2017-09-20 RX ADMIN — POTASSIUM CHLORIDE 40 MEQ: 1500 TABLET, EXTENDED RELEASE ORAL at 10:35

## 2017-09-20 RX ADMIN — AMLODIPINE BESYLATE 5 MG: 5 TABLET ORAL at 10:34

## 2017-09-20 RX ADMIN — SENNOSIDES 8.6 MG: 8.6 TABLET, FILM COATED ORAL at 10:34

## 2017-09-20 RX ADMIN — ALLOPURINOL 100 MG: 100 TABLET ORAL at 10:34

## 2017-09-20 RX ADMIN — HEPARIN SODIUM 5000 UNITS: 5000 INJECTION, SOLUTION INTRAVENOUS; SUBCUTANEOUS at 10:35

## 2017-09-20 RX ADMIN — FUROSEMIDE 20 MG: 20 TABLET ORAL at 10:35

## 2017-09-25 DIAGNOSIS — I48.19 PERSISTENT ATRIAL FIBRILLATION (HCC): ICD-10-CM

## 2017-10-05 ENCOUNTER — GENERIC CONVERSION - ENCOUNTER (OUTPATIENT)
Dept: OTHER | Facility: OTHER | Age: 82
End: 2017-10-05

## 2017-11-10 ENCOUNTER — APPOINTMENT (EMERGENCY)
Dept: RADIOLOGY | Facility: HOSPITAL | Age: 82
DRG: 291 | End: 2017-11-10
Payer: MEDICARE

## 2017-11-10 ENCOUNTER — HOSPITAL ENCOUNTER (INPATIENT)
Facility: HOSPITAL | Age: 82
LOS: 3 days | Discharge: NURSING, REHAB OR LONG-TERM CARE | DRG: 291 | End: 2017-11-13
Attending: EMERGENCY MEDICINE | Admitting: INTERNAL MEDICINE
Payer: MEDICARE

## 2017-11-10 DIAGNOSIS — J18.9 PNEUMONIA: ICD-10-CM

## 2017-11-10 DIAGNOSIS — J90 PLEURAL EFFUSION: ICD-10-CM

## 2017-11-10 DIAGNOSIS — R06.00 DYSPNEA: ICD-10-CM

## 2017-11-10 DIAGNOSIS — J96.01 ACUTE RESPIRATORY FAILURE WITH HYPOXIA (HCC): ICD-10-CM

## 2017-11-10 DIAGNOSIS — J90 PLEURAL EFFUSION, RIGHT: Primary | ICD-10-CM

## 2017-11-10 PROBLEM — N18.4 STAGE 4 CHRONIC KIDNEY DISEASE (HCC): Status: ACTIVE | Noted: 2017-09-14

## 2017-11-10 LAB
ALBUMIN SERPL BCP-MCNC: 2.4 G/DL (ref 3.5–5)
ALP SERPL-CCNC: 166 U/L (ref 46–116)
ALT SERPL W P-5'-P-CCNC: 17 U/L (ref 12–78)
ANION GAP SERPL CALCULATED.3IONS-SCNC: 10 MMOL/L (ref 4–13)
APTT PPP: 31 SECONDS (ref 23–35)
AST SERPL W P-5'-P-CCNC: 25 U/L (ref 5–45)
BACTERIA UR QL AUTO: ABNORMAL /HPF
BASOPHILS # BLD AUTO: 0.02 THOUSANDS/ΜL (ref 0–0.1)
BASOPHILS NFR BLD AUTO: 0 % (ref 0–1)
BILIRUB SERPL-MCNC: 0.71 MG/DL (ref 0.2–1)
BILIRUB UR QL STRIP: NEGATIVE
BUN SERPL-MCNC: 49 MG/DL (ref 5–25)
CALCIUM SERPL-MCNC: 8.8 MG/DL (ref 8.3–10.1)
CHLORIDE SERPL-SCNC: 100 MMOL/L (ref 100–108)
CLARITY UR: CLEAR
CLARITY, POC: CLEAR
CO2 SERPL-SCNC: 24 MMOL/L (ref 21–32)
COLOR UR: YELLOW
COLOR, POC: YELLOW
CREAT SERPL-MCNC: 1.73 MG/DL (ref 0.6–1.3)
EOSINOPHIL # BLD AUTO: 0.06 THOUSAND/ΜL (ref 0–0.61)
EOSINOPHIL NFR BLD AUTO: 1 % (ref 0–6)
ERYTHROCYTE [DISTWIDTH] IN BLOOD BY AUTOMATED COUNT: 14.5 % (ref 11.6–15.1)
GFR SERPL CREATININE-BSD FRML MDRD: 24 ML/MIN/1.73SQ M
GLUCOSE SERPL-MCNC: 101 MG/DL (ref 65–140)
GLUCOSE UR STRIP-MCNC: NEGATIVE MG/DL
HCT VFR BLD AUTO: 38.9 % (ref 34.8–46.1)
HGB BLD-MCNC: 13.4 G/DL (ref 11.5–15.4)
HGB UR QL STRIP.AUTO: ABNORMAL
HYALINE CASTS #/AREA URNS LPF: ABNORMAL /LPF
INR PPP: 1.15 (ref 0.86–1.16)
KETONES UR STRIP-MCNC: NEGATIVE MG/DL
LACTATE SERPL-SCNC: 1.3 MMOL/L (ref 0.5–2)
LEUKOCYTE ESTERASE UR QL STRIP: ABNORMAL
LYMPHOCYTES # BLD AUTO: 1.72 THOUSANDS/ΜL (ref 0.6–4.47)
LYMPHOCYTES NFR BLD AUTO: 23 % (ref 14–44)
MCH RBC QN AUTO: 32.1 PG (ref 26.8–34.3)
MCHC RBC AUTO-ENTMCNC: 34.4 G/DL (ref 31.4–37.4)
MCV RBC AUTO: 93 FL (ref 82–98)
MONOCYTES # BLD AUTO: 0.65 THOUSAND/ΜL (ref 0.17–1.22)
MONOCYTES NFR BLD AUTO: 9 % (ref 4–12)
NEUTROPHILS # BLD AUTO: 4.86 THOUSANDS/ΜL (ref 1.85–7.62)
NEUTS SEG NFR BLD AUTO: 67 % (ref 43–75)
NITRITE UR QL STRIP: NEGATIVE
NON-SQ EPI CELLS URNS QL MICRO: ABNORMAL /HPF
NRBC BLD AUTO-RTO: 0 /100 WBCS
NT-PROBNP SERPL-MCNC: ABNORMAL PG/ML
PH UR STRIP.AUTO: 6 [PH] (ref 4.5–8)
PLATELET # BLD AUTO: 251 THOUSANDS/UL (ref 149–390)
PLATELET # BLD AUTO: 280 THOUSANDS/UL (ref 149–390)
PMV BLD AUTO: 10.4 FL (ref 8.9–12.7)
PMV BLD AUTO: 10.8 FL (ref 8.9–12.7)
POTASSIUM SERPL-SCNC: 4.3 MMOL/L (ref 3.5–5.3)
PROT SERPL-MCNC: 6.8 G/DL (ref 6.4–8.2)
PROT UR STRIP-MCNC: >=300 MG/DL
PROTHROMBIN TIME: 14.7 SECONDS (ref 12.1–14.4)
RBC # BLD AUTO: 4.18 MILLION/UL (ref 3.81–5.12)
RBC #/AREA URNS AUTO: ABNORMAL /HPF
SODIUM SERPL-SCNC: 134 MMOL/L (ref 136–145)
SP GR UR STRIP.AUTO: 1.02 (ref 1–1.03)
UROBILINOGEN UR QL STRIP.AUTO: 0.2 E.U./DL
WBC # BLD AUTO: 7.34 THOUSAND/UL (ref 4.31–10.16)
WBC #/AREA URNS AUTO: ABNORMAL /HPF

## 2017-11-10 PROCEDURE — 85730 THROMBOPLASTIN TIME PARTIAL: CPT | Performed by: EMERGENCY MEDICINE

## 2017-11-10 PROCEDURE — 85049 AUTOMATED PLATELET COUNT: CPT | Performed by: INTERNAL MEDICINE

## 2017-11-10 PROCEDURE — 83880 ASSAY OF NATRIURETIC PEPTIDE: CPT | Performed by: INTERNAL MEDICINE

## 2017-11-10 PROCEDURE — 85610 PROTHROMBIN TIME: CPT | Performed by: EMERGENCY MEDICINE

## 2017-11-10 PROCEDURE — 36415 COLL VENOUS BLD VENIPUNCTURE: CPT | Performed by: EMERGENCY MEDICINE

## 2017-11-10 PROCEDURE — 87040 BLOOD CULTURE FOR BACTERIA: CPT | Performed by: INTERNAL MEDICINE

## 2017-11-10 PROCEDURE — 99285 EMERGENCY DEPT VISIT HI MDM: CPT

## 2017-11-10 PROCEDURE — 80053 COMPREHEN METABOLIC PANEL: CPT | Performed by: EMERGENCY MEDICINE

## 2017-11-10 PROCEDURE — 81002 URINALYSIS NONAUTO W/O SCOPE: CPT | Performed by: EMERGENCY MEDICINE

## 2017-11-10 PROCEDURE — 87449 NOS EACH ORGANISM AG IA: CPT | Performed by: INTERNAL MEDICINE

## 2017-11-10 PROCEDURE — 83605 ASSAY OF LACTIC ACID: CPT | Performed by: EMERGENCY MEDICINE

## 2017-11-10 PROCEDURE — 85025 COMPLETE CBC W/AUTO DIFF WBC: CPT | Performed by: EMERGENCY MEDICINE

## 2017-11-10 PROCEDURE — 96365 THER/PROPH/DIAG IV INF INIT: CPT

## 2017-11-10 PROCEDURE — 87086 URINE CULTURE/COLONY COUNT: CPT

## 2017-11-10 PROCEDURE — 81001 URINALYSIS AUTO W/SCOPE: CPT

## 2017-11-10 PROCEDURE — 71020 HB CHEST X-RAY 2VW FRONTAL&LATL: CPT

## 2017-11-10 RX ORDER — VANCOMYCIN HYDROCHLORIDE 500 MG/100ML
15 INJECTION, SOLUTION INTRAVENOUS ONCE
Status: COMPLETED | OUTPATIENT
Start: 2017-11-10 | End: 2017-11-10

## 2017-11-10 RX ORDER — POTASSIUM CHLORIDE 750 MG/1
10 TABLET, EXTENDED RELEASE ORAL DAILY
Status: DISCONTINUED | OUTPATIENT
Start: 2017-11-11 | End: 2017-11-11

## 2017-11-10 RX ORDER — FUROSEMIDE 20 MG/1
20 TABLET ORAL DAILY
Status: DISCONTINUED | OUTPATIENT
Start: 2017-11-11 | End: 2017-11-11

## 2017-11-10 RX ORDER — AMLODIPINE BESYLATE 5 MG/1
5 TABLET ORAL DAILY
Status: DISCONTINUED | OUTPATIENT
Start: 2017-11-11 | End: 2017-11-13 | Stop reason: HOSPADM

## 2017-11-10 RX ORDER — LEVOTHYROXINE SODIUM 0.07 MG/1
75 TABLET ORAL
Status: DISCONTINUED | OUTPATIENT
Start: 2017-11-11 | End: 2017-11-13 | Stop reason: HOSPADM

## 2017-11-10 RX ORDER — HEPARIN SODIUM 5000 [USP'U]/ML
5000 INJECTION, SOLUTION INTRAVENOUS; SUBCUTANEOUS EVERY 8 HOURS SCHEDULED
Status: DISCONTINUED | OUTPATIENT
Start: 2017-11-10 | End: 2017-11-13 | Stop reason: HOSPADM

## 2017-11-10 RX ORDER — PANTOPRAZOLE SODIUM 40 MG/1
40 TABLET, DELAYED RELEASE ORAL DAILY
Status: DISCONTINUED | OUTPATIENT
Start: 2017-11-11 | End: 2017-11-13 | Stop reason: HOSPADM

## 2017-11-10 RX ORDER — METOPROLOL TARTRATE 50 MG/1
100 TABLET, FILM COATED ORAL EVERY 12 HOURS SCHEDULED
Status: DISCONTINUED | OUTPATIENT
Start: 2017-11-10 | End: 2017-11-13 | Stop reason: HOSPADM

## 2017-11-10 RX ORDER — ACETAMINOPHEN 325 MG/1
650 TABLET ORAL EVERY 6 HOURS PRN
Status: DISCONTINUED | OUTPATIENT
Start: 2017-11-10 | End: 2017-11-13 | Stop reason: HOSPADM

## 2017-11-10 RX ORDER — ALLOPURINOL 100 MG/1
100 TABLET ORAL DAILY
Status: DISCONTINUED | OUTPATIENT
Start: 2017-11-11 | End: 2017-11-13 | Stop reason: HOSPADM

## 2017-11-10 RX ORDER — LEVOFLOXACIN 500 MG/1
750 TABLET, FILM COATED ORAL EVERY 24 HOURS
COMMUNITY
End: 2017-11-13 | Stop reason: HOSPADM

## 2017-11-10 RX ADMIN — CEFEPIME 2000 MG: 2 INJECTION, POWDER, FOR SOLUTION INTRAMUSCULAR; INTRAVENOUS at 17:08

## 2017-11-10 RX ADMIN — HEPARIN SODIUM 5000 UNITS: 5000 INJECTION, SOLUTION INTRAVENOUS; SUBCUTANEOUS at 22:00

## 2017-11-10 RX ADMIN — METOPROLOL TARTRATE 100 MG: 50 TABLET ORAL at 22:00

## 2017-11-10 RX ADMIN — VANCOMYCIN HYDROCHLORIDE 500 MG: 500 INJECTION, SOLUTION INTRAVENOUS at 17:50

## 2017-11-10 NOTE — ED ATTENDING ATTESTATION
Jah Donald MD, saw and evaluated the patient  I have discussed the patient with the resident/non-physician practitioner and agree with the resident's/non-physician practitioner's findings, Plan of Care, and MDM as documented in the resident's/non-physician practitioner's note, except where noted  All available labs and Radiology studies were reviewed  At this point I agree with the current assessment done in the Emergency Department  I have conducted an independent evaluation of this patient a history and physical is as follows:    PT presents with diagnosis of pneumonia   Pt was started on oral antibiotics Pt has been coughing for several days Pt denies co  Per family they are concerned because of recent uti and pneumonia MDM: will do medical montesinos and admit for iv antibiotics  Critical Care Time  CritCare Time

## 2017-11-10 NOTE — ED NOTES
Nursing home updated on patient's status and that patient will be admitted to [address] [place] [address],[address], RN  11/10/17 3235

## 2017-11-11 PROBLEM — E43 SEVERE PROTEIN-CALORIE MALNUTRITION (HCC): Chronic | Status: ACTIVE | Noted: 2017-11-11

## 2017-11-11 LAB
ANION GAP SERPL CALCULATED.3IONS-SCNC: 10 MMOL/L (ref 4–13)
ANION GAP SERPL CALCULATED.3IONS-SCNC: 11 MMOL/L (ref 4–13)
BACTERIA UR CULT: NORMAL
BACTERIA UR QL AUTO: ABNORMAL /HPF
BASOPHILS # BLD AUTO: 0.01 THOUSANDS/ΜL (ref 0–0.1)
BASOPHILS NFR BLD AUTO: 0 % (ref 0–1)
BILIRUB UR QL STRIP: NEGATIVE
BUN SERPL-MCNC: 47 MG/DL (ref 5–25)
BUN SERPL-MCNC: 47 MG/DL (ref 5–25)
CALCIUM SERPL-MCNC: 8.7 MG/DL (ref 8.3–10.1)
CALCIUM SERPL-MCNC: 8.9 MG/DL (ref 8.3–10.1)
CHLORIDE SERPL-SCNC: 99 MMOL/L (ref 100–108)
CHLORIDE SERPL-SCNC: 99 MMOL/L (ref 100–108)
CLARITY UR: CLEAR
CO2 SERPL-SCNC: 25 MMOL/L (ref 21–32)
CO2 SERPL-SCNC: 28 MMOL/L (ref 21–32)
COLOR UR: YELLOW
CREAT SERPL-MCNC: 1.77 MG/DL (ref 0.6–1.3)
CREAT SERPL-MCNC: 1.82 MG/DL (ref 0.6–1.3)
EOSINOPHIL # BLD AUTO: 0.14 THOUSAND/ΜL (ref 0–0.61)
EOSINOPHIL NFR BLD AUTO: 3 % (ref 0–6)
ERYTHROCYTE [DISTWIDTH] IN BLOOD BY AUTOMATED COUNT: 14.4 % (ref 11.6–15.1)
ERYTHROCYTE [DISTWIDTH] IN BLOOD BY AUTOMATED COUNT: 14.4 % (ref 11.6–15.1)
GFR SERPL CREATININE-BSD FRML MDRD: 22 ML/MIN/1.73SQ M
GFR SERPL CREATININE-BSD FRML MDRD: 23 ML/MIN/1.73SQ M
GGT SERPL-CCNC: 74 U/L (ref 5–85)
GLUCOSE SERPL-MCNC: 88 MG/DL (ref 65–140)
GLUCOSE SERPL-MCNC: 92 MG/DL (ref 65–140)
GLUCOSE SERPL-MCNC: 95 MG/DL (ref 65–140)
GLUCOSE UR STRIP-MCNC: NEGATIVE MG/DL
HCT VFR BLD AUTO: 35.5 % (ref 34.8–46.1)
HCT VFR BLD AUTO: 36.5 % (ref 34.8–46.1)
HGB BLD-MCNC: 12.1 G/DL (ref 11.5–15.4)
HGB BLD-MCNC: 12.4 G/DL (ref 11.5–15.4)
HGB UR QL STRIP.AUTO: ABNORMAL
HYALINE CASTS #/AREA URNS LPF: ABNORMAL /LPF
KETONES UR STRIP-MCNC: NEGATIVE MG/DL
L PNEUMO1 AG UR QL IA.RAPID: NEGATIVE
LEUKOCYTE ESTERASE UR QL STRIP: ABNORMAL
LYMPHOCYTES # BLD AUTO: 1.43 THOUSANDS/ΜL (ref 0.6–4.47)
LYMPHOCYTES NFR BLD AUTO: 31 % (ref 14–44)
MAGNESIUM SERPL-MCNC: 1.7 MG/DL (ref 1.6–2.6)
MCH RBC QN AUTO: 31.7 PG (ref 26.8–34.3)
MCH RBC QN AUTO: 32 PG (ref 26.8–34.3)
MCHC RBC AUTO-ENTMCNC: 34 G/DL (ref 31.4–37.4)
MCHC RBC AUTO-ENTMCNC: 34.1 G/DL (ref 31.4–37.4)
MCV RBC AUTO: 93 FL (ref 82–98)
MCV RBC AUTO: 94 FL (ref 82–98)
MONOCYTES # BLD AUTO: 0.56 THOUSAND/ΜL (ref 0.17–1.22)
MONOCYTES NFR BLD AUTO: 12 % (ref 4–12)
NEUTROPHILS # BLD AUTO: 2.49 THOUSANDS/ΜL (ref 1.85–7.62)
NEUTS SEG NFR BLD AUTO: 54 % (ref 43–75)
NITRITE UR QL STRIP: NEGATIVE
NON-SQ EPI CELLS URNS QL MICRO: ABNORMAL /HPF
NRBC BLD AUTO-RTO: 0 /100 WBCS
PH UR STRIP.AUTO: 5.5 [PH] (ref 4.5–8)
PHOSPHATE SERPL-MCNC: 3.4 MG/DL (ref 2.3–4.1)
PLATELET # BLD AUTO: 243 THOUSANDS/UL (ref 149–390)
PLATELET # BLD AUTO: 264 THOUSANDS/UL (ref 149–390)
PMV BLD AUTO: 10.1 FL (ref 8.9–12.7)
PMV BLD AUTO: 10.4 FL (ref 8.9–12.7)
POTASSIUM SERPL-SCNC: 3.4 MMOL/L (ref 3.5–5.3)
POTASSIUM SERPL-SCNC: 3.5 MMOL/L (ref 3.5–5.3)
PROT UR STRIP-MCNC: ABNORMAL MG/DL
RBC # BLD AUTO: 3.78 MILLION/UL (ref 3.81–5.12)
RBC # BLD AUTO: 3.91 MILLION/UL (ref 3.81–5.12)
RBC #/AREA URNS AUTO: ABNORMAL /HPF
S PNEUM AG UR QL: NEGATIVE
SODIUM SERPL-SCNC: 135 MMOL/L (ref 136–145)
SODIUM SERPL-SCNC: 137 MMOL/L (ref 136–145)
SP GR UR STRIP.AUTO: 1.01 (ref 1–1.03)
UROBILINOGEN UR QL STRIP.AUTO: 0.2 E.U./DL
WBC # BLD AUTO: 4.64 THOUSAND/UL (ref 4.31–10.16)
WBC # BLD AUTO: 5.69 THOUSAND/UL (ref 4.31–10.16)
WBC #/AREA URNS AUTO: ABNORMAL /HPF

## 2017-11-11 PROCEDURE — 82977 ASSAY OF GGT: CPT | Performed by: INTERNAL MEDICINE

## 2017-11-11 PROCEDURE — 82948 REAGENT STRIP/BLOOD GLUCOSE: CPT

## 2017-11-11 PROCEDURE — 85025 COMPLETE CBC W/AUTO DIFF WBC: CPT | Performed by: STUDENT IN AN ORGANIZED HEALTH CARE EDUCATION/TRAINING PROGRAM

## 2017-11-11 PROCEDURE — 80048 BASIC METABOLIC PNL TOTAL CA: CPT | Performed by: STUDENT IN AN ORGANIZED HEALTH CARE EDUCATION/TRAINING PROGRAM

## 2017-11-11 PROCEDURE — 84100 ASSAY OF PHOSPHORUS: CPT | Performed by: INTERNAL MEDICINE

## 2017-11-11 PROCEDURE — 83735 ASSAY OF MAGNESIUM: CPT | Performed by: INTERNAL MEDICINE

## 2017-11-11 PROCEDURE — 85027 COMPLETE CBC AUTOMATED: CPT | Performed by: INTERNAL MEDICINE

## 2017-11-11 PROCEDURE — 81001 URINALYSIS AUTO W/SCOPE: CPT | Performed by: INTERNAL MEDICINE

## 2017-11-11 PROCEDURE — 80048 BASIC METABOLIC PNL TOTAL CA: CPT | Performed by: INTERNAL MEDICINE

## 2017-11-11 RX ORDER — FUROSEMIDE 10 MG/ML
20 INJECTION INTRAMUSCULAR; INTRAVENOUS
Status: DISCONTINUED | OUTPATIENT
Start: 2017-11-11 | End: 2017-11-13 | Stop reason: HOSPADM

## 2017-11-11 RX ORDER — VANCOMYCIN HYDROCHLORIDE 500 MG/100ML
15 INJECTION, SOLUTION INTRAVENOUS ONCE
Status: DISCONTINUED | OUTPATIENT
Start: 2017-11-11 | End: 2017-11-11

## 2017-11-11 RX ORDER — FUROSEMIDE 10 MG/ML
10 INJECTION INTRAMUSCULAR; INTRAVENOUS ONCE
Status: COMPLETED | OUTPATIENT
Start: 2017-11-11 | End: 2017-11-11

## 2017-11-11 RX ADMIN — HEPARIN SODIUM 5000 UNITS: 5000 INJECTION, SOLUTION INTRAVENOUS; SUBCUTANEOUS at 05:38

## 2017-11-11 RX ADMIN — PANTOPRAZOLE SODIUM 40 MG: 40 TABLET, DELAYED RELEASE ORAL at 09:01

## 2017-11-11 RX ADMIN — METOPROLOL TARTRATE 100 MG: 50 TABLET ORAL at 09:01

## 2017-11-11 RX ADMIN — AMLODIPINE BESYLATE 5 MG: 5 TABLET ORAL at 09:01

## 2017-11-11 RX ADMIN — LEVOTHYROXINE SODIUM 75 MCG: 75 TABLET ORAL at 05:38

## 2017-11-11 RX ADMIN — HEPARIN SODIUM 5000 UNITS: 5000 INJECTION, SOLUTION INTRAVENOUS; SUBCUTANEOUS at 21:39

## 2017-11-11 RX ADMIN — HEPARIN SODIUM 5000 UNITS: 5000 INJECTION, SOLUTION INTRAVENOUS; SUBCUTANEOUS at 14:14

## 2017-11-11 RX ADMIN — FUROSEMIDE 10 MG: 10 INJECTION, SOLUTION INTRAMUSCULAR; INTRAVENOUS at 01:00

## 2017-11-11 RX ADMIN — FUROSEMIDE 20 MG: 20 TABLET ORAL at 09:01

## 2017-11-11 RX ADMIN — ALLOPURINOL 100 MG: 100 TABLET ORAL at 09:01

## 2017-11-11 RX ADMIN — METOPROLOL TARTRATE 100 MG: 50 TABLET ORAL at 21:39

## 2017-11-11 RX ADMIN — FUROSEMIDE 20 MG: 10 INJECTION, SOLUTION INTRAMUSCULAR; INTRAVENOUS at 17:00

## 2017-11-11 NOTE — CASE MANAGEMENT
Initial Clinical Review    Admission: Date/Time/Statement: 11/10/17 @ 1739     Orders Placed This Encounter   Procedures    Inpatient Admission (expected length of stay for this patient is greater than two midnights)     Standing Status:   Standing     Number of Occurrences:   1     Order Specific Question:   Admitting Physician     Answer:   Marta Recio [[id number]]     Order Specific Question:   Level of Care     Answer:   Med Surg [[id number]]     Order Specific Question:   Estimated length of stay     Answer:   More than 2 Midnights     Order Specific Question:   Certification     Answer:   I certify that inpatient services are medically necessary for this patient for a duration of greater than two midnights  See H&P and MD Progress Notes for additional information about the patient's course of treatment  ED: Date/Time/Mode of Arrival:   ED Arrival Information     Expected Arrival Acuity Means of Arrival Escorted By Service Admission Type    - 11/10/2017 14:20 Urgent Ambulance [facility] Urgent    Arrival Complaint    weakness          Chief Complaint:   Chief Complaint   Patient presents with    Medical Problem - Re-Evaluation     Granddaughter "She was just diagnosised with pnemonia by [facility] yesterday  She has only been on antibiotics for one day but we want her looked at  She was diagnosised with a UTI a couple of weeks ago, and she has a cough, and she has a wound on her backside  We just want her reevaluated and actually monitored"        History of Illness: 80 y o  female with past medical persistent atrial fibrillation, hypothyroidism, stage 4 CKD, essential hypertension, severe protein calorie malnutrition, systolic heart failure with EF 30% , 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 presents with 1 month duration productive cough  Patient developed cough 1 month ago  She is currently a resident at [facility]    She notes that she is coughing all throughout the day productive of sputum but she cannot describe the color or consistency  Patient's granddaughter reports possible sick contacts  She notes she had bronchitis about 1 month ago as well as her children had viral infections over this last month and have had contact with her grandmother  Patient denies any fevers, chills, shortness of breath, abdominal pain, constipation, diarrhea, nausea, vomiting, or dysuria  She uses a wheelchair to ambulate and have not noticed any dyspnea on exertion  She does not require oxygen at baseline  She denies pillow orthopnea or swelling of her lower extremities    Due to this chronic cough, nursing staff performed physical exam on patient finding "crackles" in lungs which prompted admission to emergency room        ED Vital Signs:   ED Triage Vitals   Temperature Pulse Respirations Blood Pressure SpO2   11/10/17 1424 11/10/17 1424 11/10/17 1424 11/10/17 1424 11/10/17 1424   98 2 °F (36 8 °C) (!) 109 (!) 24 139/80 94 %      Temp Source Heart Rate Source Patient Position - Orthostatic VS BP Location FiO2 (%)   11/10/17 1424 11/10/17 1530 11/10/17 1424 11/10/17 1424 --   Oral Monitor Lying Right arm       Pain Score       11/10/17 1424       5        Wt Readings from Last 1 Encounters:   11/10/17 40 4 kg (89 lb)       Vital Signs (abnormal):  HR , RR 16-24    Abnormal Labs/Diagnostic Test Results:   Lab Units 11/10/17  1657   SODIUM mmol/L 134*   POTASSIUM mmol/L 4 3   CHLORIDE mmol/L 100   CO2 mmol/L 24   ANION GAP mmol/L 10   BUN mg/dL 49*   CREATININE mg/dL 1 73*   GLUCOSE RANDOM mg/dL 101   CALCIUM mg/dL 8 8   AST U/L 25   ALT U/L 17   ALK PHOS U/L 166*   TOTAL PROTEIN g/dL 6 8   ALBUMIN g/dL 2 4*   BILIRUBIN TOTAL mg/dL 0 71   EGFR ml/min/1 73sq m 24     Lab Units 11/10/17  1530   PROTIME seconds 14 7*   INR   1 15   PTT seconds 31       Lab Units 11/10/17  1711   COLOR UA   Yellow   CLARITY UA   Clear   SPEC GRAV UA   1 020   PH UA   6 0   LEUKOCYTES UA   Small*   NITRITE UA   Negative   PROTEIN UA mg/dl >=300*   GLUCOSE UA mg/dl Negative   KETONES UA mg/dl Negative   BILIRUBIN UA   Negative   BLOOD UA   Large*      Lab Units 11/10/17  1711   RBC UA /hpf 30-50*   WBC UA /hpf Innumerable*   EPITHELIAL CELLS WET PREP /hpf Occasional   BACTERIA UA /hpf Occasional      CXR -- New moderate sized right pleural effusion  Stable small left pleural effusion  Underlying atelectasis or infiltrate is not excluded  ED Treatment:   Medication Administration from 11/10/2017 1420 to 11/10/2017 1907       Date/Time Order Dose Route Action Action by Comments     11/10/2017 1750 vancomycin (VANCOCIN) IVPB (premix) 500 mg 500 mg Intravenous New Bag Mervin Barrow RN      11/10/2017 1746 cefepime (MAXIPIME) 2 g/50 mL dextrose IVPB 0 mg Intravenous Stopped Mervin Barrow RN      11/10/2017 1708 cefepime (MAXIPIME) 2 g/50 mL dextrose IVPB 2,000 mg Intravenous New Bag Mervin Barrow RN           Past Medical/Surgical History:   Past Medical History:   Diagnosis Date    Bowel perforation ([facility] )     Cataract     H/O colostomy     Hiatal hernia     Kidney failure     PVD (peripheral vascular disease) ([facility] )        Admitting Diagnosis: Weakness [R53 1]    Age/Sex: 80 y o  female    Assessment/Plan:   Patient is a 8 year old female with past medical history hypertension, systolic CHF, hypothyroidism, CKD 4, persistent atrial fibrillation who presents with acute hypoxic respiratory failure and asymptomatic bacteria      Severe Sepsis  Likely 2/2 to UTI vs Pneumonia   Patient met SIRS criteria with Tachycardia with respiratory rate 24/min  CXR with right sided pleural effusion and small left sided pleural effusion, with underlying pneumonia not ruled out   Given vancomycin and cefepime in ED  With clinical picture of 1 month productive cough, without fevers or leukocytosis and with CXR showing no definitive consolidation, pnuemonia not as likely    Will hold off antibiotics at this time and monitor clinically  Will follow up blood cultures, strep pneumonia and legionella antigens, MRSA culture     Acute Hypoxic Respiratory failure   Currently saturating low 90s on 3LNC, no oxygen requirement at baseline   Likely secondary to CHF exacerbation vs Pneumonia   CXR with b/l pleural effusions, cannot rule out atelectasis or infiltrate   BNP [id number]  Given 10 mg IV lasix   Continue supplemental oxygenation with goal spO2 > 88%  Consider pulmonology consultation for right sided thoracentesis  Patient denied procedure on last admission        Chronic systolic heart failure  Most recent echocardiogram 9/16/2017 EF 30% with severe hypokinesis  Chest x-ray with right-sided pleural effusion, stable small left pleural effusion  BNP 38,000  Will give 10 mg IV lasix  Continue home Lasix 20 mg daily   Monitor I/Os   Continue Lopressor 100 mg q 12 hours  Not on ACEI due to allergy   Cardiac diet      Persistent atrial fibrillation  Patient rate controlled on Lopressor 100 mg b i d  No anticoagulation with history of vaginal bleeding     Asymptomatic Bacteriuria   UA with large blood, small leukocytes, protein, RBCs, innumerable WBC, occasional bacteria   On previous admission in 9/17 had symptomatic UTI with E  Coli sensitive treated with rocephin   No fever, leukocytosis, frequency, dysuria, suprapubic tenderness     Will hold off initiating anabiotics at this time   Will follow up urine culture      Hypertension  Well controlled on hospitalization  Continue home amlodipine 5 mg daily and Lopressor 100 mg b i d      Hypothyroidism  Will continue levothyroxine 75 mcg daily     CKD4  Cr 1 3, GFR 24  At baseline   Will continue to monitor      GERD  Will continue Protonix 40 mg daily     Gout  Continue allopurinol 100 mg daily      Severe protein calorie malnutrition  Thin, frail appearing   Albumin 2 4  Consider consult Nutrition for evaluation     Code Status: Level 3 - DNAR/DNI  VTE Pharmacologic Prophylaxis: Heparin   VTE Mechanical Prophylaxis: sequential compression device  Admission Status: INPATIENT       Admission Orders:  M/S/Tele unit  Telem  O2 3L nc  Consult pulmonology  Cardiac diet  venodynes b/l le  Up with assistance      Scheduled Meds:   allopurinol 100 mg Oral Daily   amLODIPine 5 mg Oral Daily   furosemide 20 mg Intravenous BID (diuretic)   heparin (porcine) 5,000 Units Subcutaneous Q8H [address]   levothyroxine 75 mcg Oral Early Morning   metoprolol tartrate 100 mg Oral Q12H KENY   pantoprazole 40 mg Oral Daily     Continuous Infusions:    PRN Meds:   acetaminophen

## 2017-11-11 NOTE — PROGRESS NOTES
[facility] Admission Note   Unit/Bed # @DBLINK (TAN,53182)@ Encounter: [id number]  [place] Team C           Radha Medellin 80 y o  female [phone]       Patient seen and examined  Reviewed H&P per Dr Augustin Rogers  Agree with the assessment and plan unless otherwise noted  This is 8 year old female with past medical history of persistent atrial fibrillation, hypothyroidism, stage IV CKD, hypertension, severe protein calorie malnutrition, heart failure with reduced EF echo showing EF 30%, history of left hemicolectomy with ostomy who came to [facility] ED from a nursing home with complaint of cough which has been going on for 1 month  Patient was discharged from [facility] in September, at that time she was evaluated for right-sided pleural effusion and bleeding from the colostomy bag  Patient reported that she has been having a productive cough however she is unable to tell us the color of the sputum  Rest of review of system was negative  On admission vitals:  T 98 1°, P 109, R 24, /80, SpO2 94% on 2L nasal cannula  CBC was unremarkable, CMP showed creatinine 1 73 which seems to be around patient's baseline, alk phos 166 which is slightly above what it was last admission, patient's NT proBNP is 38,000  Urine showed small leukocyte, large blood, innumerable WBCs  Chest x-ray showed moderate-size right pleural effusion which was also present on the CT abdomen in September  Patient received vancomycin and cefepime in the ED for pneumonia  Patient will be admitted to [facility] for further care and management       Assessment/Plan: Principal Problem:    Acute respiratory failure with hypoxia (HCC)  Active Problems:    Atrial fibrillation, persistent (HCC)    Pacemaker    Hypothyroidism    UTI (urinary tract infection)    Stage 3 chronic kidney disease    Essential hypertension    Pleural effusion, right     Severe sepsis likely secondary to pneumonia vs UTI:  Although patient has met the SIRS criteria in the ED with possible source of infection being urine versus lungs and organ dysfunction being acute hypoxic respiratory failure, this is not likely secondary to infectious process  Patient does not have a elevated WBC, she is not febrile, she does not look toxic  Chest x-ray shows moderate right-sided pleural effusion and a small left-sided pleural effusion which was also present on CT in September, although underlying pneumonia cannot be excluded, patient does not have any positive review of systems were this other than this chronic cough for more than 4 weeks  I will monitor the patient off of antibiotics for now, will order blood cultures, urine culture, sputum cultures, urine is strep pneumo and urine Legionella  Patient already did receive antibiotics in the ED prior to blood cultures were obtained, although I believe the urine culture was obtained before she received the antibiotic  Will continue to monitor the patient closely  Acute hypoxic respiratory failure likely secondary to pleural effusion: On physical exam when laying down flat patient's oxygenation and was 83% while extending up a straight her SpO2 was 91%  Although patient does not look volume overloaded on exam she does have left-sided heart failure and bilateral pleural effusions, her BNP is over 38,000  Will give her 1 extra dose of Lasix 20 mg and continue her home dose in the morning  Patient might benefit from a right-sided thoracentesis for diagnostic and therapeutic purposes  The pleural effusion is likely secondary to heart failure with reduced EF  Heart failure with reduced EF likely an acute exacerbation:  Patient's proBNP is over 38,000, will give 1 extra dose of Lasix IV tonight and resume home dose in the morning  Will monitor I&Os  Cardiac diet    Atrial fibrillation rate controlled:  Patient is currently rate controlled on Lopressor, she is not on any anticoagulation, will monitor on tele  Asymptomatic bacteriuria:  Patient had a small leukocytes, large blood, innumerable WBCs in her urine  During her previous admission patient did have UTI which was showed to be E coli sensitive to Rocephin  Also during this admission patient denies any symptoms including dysuria, frequency, hesitancy, suprapubic tenderness  Will follow the urine culture and hold antibiotics off at this time    CKD stage 4:  Cr appears to be at baseline, avoid nephrotoxin agents    Hypertension:  Well controlled on home regimen    Hypothyroidism: Continue home Synthroid    GERD:  Continue home Protonix    Gout:  Continue home allopurinol    Elevated alk-phos:  Patient is elevated alk-phos, it was elevated during previous admission as well, at that time CT abdomen showed an unremarkable liver    We will get a GGT, will not recommend any further workup at this time    Severe protein calorie malnutrition:  Will order Ensure    Diet: Cardiac step 1 diet    DVT prophylaxis:  Heparin    Code status:  DNR/DNI per patient, grand daughter at bedside    Disposition:  INPATIENT     Expected LOS: <2 9 Nayana Mckee DO

## 2017-11-11 NOTE — PROGRESS NOTES
IM Residency Progress Note   Unit/Bed#: [facility] 721-01 Encounter: [id number]  SOD Team C       Hemalatha Medellin 80 y o  female [id number]    Hospital Stay Days: 1      Assessment/Plan:    Principal Problem:    Acute respiratory failure with hypoxia ([address] )  Active Problems:    Atrial fibrillation, persistent (HCC)    Pacemaker    Hypothyroidism    UTI (urinary tract infection)    Stage 4 chronic kidney disease (HCC)    Essential hypertension    Pleural effusion, right    Severe protein-calorie malnutrition (HCC)      SIRS versus Severe sepsis   - thought to be secondary to pneumonia vs UTI:    - improving - tachypnea has resolved and tachycardia is improving   Patient does not look toxic, does not have a leukocytosis and does not have fever  Symptoms were more likely secondary to acute hypoxic respiratory failure and possible CHF exacerbation than an infection  Her NT proBNP was 38,192 and she has a new moderate right-sided pleural effusion and a stable left-sided pleural effusion on chest x-ray    - will consult pulmonology  - We will continue to hold antibiotics for now and monitor her clinically and follow up with the result of the urine culture   - will continue with Lasix, monitor her intake and output, continue with fluid and sodium restriction and consult Cardiology      Acute hypoxic respiratory failure likely secondary to pleural effusion:   - improving symptoms  - patient is satting at 92% and above on 2 L of oxygen by nasal cannula   - she may benefit from thoracentesis  - will follow up with pulmonology      Heart failure with reduced EF likely an acute exacerbation:    - last echo on September 16th, 2017 showed an EF of 30% with markedly reduced systolic function and elevated peak pulmonary artery pressure of 45 mmHg  -  continue with Lasix   - follow up with Cardiology recommendations  - continue with strict monitoring of intake and output, fluid and sodium restriction cardiac diet   - she is -780 cc since admission      Atrial fibrillation rate controlled:    - Patient is currently rate controlled on Lopressor, she is not on any anticoagulation secondary to vaginal bleeding    - telemetry was uneventful     Asymptomatic bacteriuria:    - will follow up with result of urine culture and hold off antibiotics for now      CKD stage 4:    - creatinine this morning is 1 77 with a baseline of 1 6-1 9  - continue to avoid nephrotoxic agents      Hypertension:    - controlled  - continue with amlodipine, metoprolol and Lasix      Hypothyroidism:   - continue with Synthroid  - will check TSH     GERD:    - continue with pantoprazole     Gout:    - continue with allopurinol     Elevated alk-phos:   - alk-phos was 166 but GTT and total bilirubin were normal  - will continue to monitor liver function    Severe protein calorie malnutrition:   - will consult nutrition and continue with Ensure       Disposition:  Will continue with Lasix and follow-up Cardiology recommendations  Subjective:   Patient seen and examined  Per nursing staff, she had an uneventful  She states she has no complaints this morning  She denies chest pain, shortness of breath, palpitations, fever, chills, night sweats, headache, nausea, vomiting or abdominal pain  Vitals: Temp (24hrs), Av 1 °F (36 7 °C), Min:98 °F (36 7 °C), Max:98 2 °F (36 8 °C)  Current: Temperature: 98 °F (36 7 °C)  Vitals:    11/1933 11/10/17 2200 11/10/17 2328 17 0739   BP: 144/96 134/75 120/63 116/67   Pulse: (!) 115 105 97 98   Resp: 20  16 18   Temp: 98 °F (36 7 °C)  98 1 °F (36 7 °C) 98 °F (36 7 °C)   TempSrc: Oral  Oral Axillary   SpO2: 93%  92% 98%   Weight:       Height:        Body mass index is 17 38 kg/m²  I/O last 24 hours: In: -   Out: 750 [Urine:450; Stool:300]      Physical Exam:  GENERAL:  In no obvious distress, sleeping comfortably in her bed, afebrile to touch  Does not want to be bothered, answering questions reluctantly  Emaciated  HEENT:  Bitemporal wasting, atraumatic, refuses to open her eyes to be examined  NECK:  No lymphadenopathy  CVS:  S1, S2, irregularly irregular, no murmurs, rubs or gallops appreciated  RESPIRATORY:  Decreased breath sounds in the right lower lung zone plus occasional crackles  ABDOMEN:  Soft, nondistended nontender  No masses or organomegaly palpable, +normoactive bowel sounds  MSK:  No pedal edema bilaterally  NEURO:  Sleepy      Invasive Devices     Peripheral Intravenous Line            Peripheral IV 11/10/17 Left Arm less than 1 day          Drain            Colostomy LUQ 3347 days                          Labs:   Recent Results (from the past 24 hour(s))   CBC and differential    Collection Time: 11/10/17  3:30 PM   Result Value Ref Range    WBC 7 34 4 31 - 10 16 Thousand/uL    RBC 4 18 3 81 - 5 12 Million/uL    Hemoglobin 13 4 11 5 - 15 4 g/dL    Hematocrit 38 9 34 8 - 46 1 %    MCV 93 82 - 98 fL    MCH 32 1 26 8 - 34 3 pg    MCHC 34 4 31 4 - 37 4 g/dL    RDW 14 5 11 6 - 15 1 %    MPV 10 8 8 9 - 12 7 fL    Platelets 800 134 - 955 Thousands/uL    nRBC 0 /100 WBCs    Neutrophils Relative 67 43 - 75 %    Lymphocytes Relative 23 14 - 44 %    Monocytes Relative 9 4 - 12 %    Eosinophils Relative 1 0 - 6 %    Basophils Relative 0 0 - 1 %    Neutrophils Absolute 4 86 1 85 - 7 62 Thousands/µL    Lymphocytes Absolute 1 72 0 60 - 4 47 Thousands/µL    Monocytes Absolute 0 65 0 17 - 1 22 Thousand/µL    Eosinophils Absolute 0 06 0 00 - 0 61 Thousand/µL    Basophils Absolute 0 02 0 00 - 0 10 Thousands/µL   Lactic acid, plasma    Collection Time: 11/10/17  3:30 PM   Result Value Ref Range    LACTIC ACID 1 3 0 5 - 2 0 mmol/L   Protime-INR    Collection Time: 11/10/17  3:30 PM   Result Value Ref Range    Protime 14 7 (H) 12 1 - 14 4 seconds    INR 1 15 0 86 - 1 16   APTT    Collection Time: 11/10/17  3:30 PM   Result Value Ref Range    PTT 31 23 - 35 seconds   POCT urinalysis dipstick    Collection Time: 11/10/17  4:07 PM   Result Value Ref Range    Color, UA yellow     Clarity, UA clear    Comprehensive metabolic panel    Collection Time: 11/10/17  4:57 PM   Result Value Ref Range    Sodium 134 (L) 136 - 145 mmol/L    Potassium 4 3 3 5 - 5 3 mmol/L    Chloride 100 100 - 108 mmol/L    CO2 24 21 - 32 mmol/L    Anion Gap 10 4 - 13 mmol/L    BUN 49 (H) 5 - 25 mg/dL    Creatinine 1 73 (H) 0 60 - 1 30 mg/dL    Glucose 101 65 - 140 mg/dL    Calcium 8 8 8 3 - 10 1 mg/dL    AST 25 5 - 45 U/L    ALT 17 12 - 78 U/L    Alkaline Phosphatase 166 (H) 46 - 116 U/L    Total Protein 6 8 6 4 - 8 2 g/dL    Albumin 2 4 (L) 3 5 - 5 0 g/dL    Total Bilirubin 0 71 0 20 - 1 00 mg/dL    eGFR 24 ml/min/1 73sq m   NT-BNP PRO    Collection Time: 11/10/17  4:57 PM   Result Value Ref Range    NT-proBNP 38,192 (H) <450 pg/mL   ED Urine Macroscopic    Collection Time: 11/10/17  5:11 PM   Result Value Ref Range    Color, UA Yellow     Clarity, UA Clear     pH, UA 6 0 4 5 - 8 0    Leukocytes, UA Small (A) Negative    Nitrite, UA Negative Negative    Protein, UA >=300 (A) Negative mg/dl    Glucose, UA Negative Negative mg/dl    Ketones, UA Negative Negative mg/dl    Urobilinogen, UA 0 2 0 2, 1 0 E U /dl E U /dl    Bilirubin, UA Negative Negative    Blood, UA Large (A) Negative    Specific Gravity, UA 1 020 1 003 - 1 030   Urine Microscopic    Collection Time: 11/10/17  5:11 PM   Result Value Ref Range    RBC, UA 30-50 (A) None Seen, 0-5 /hpf    WBC, UA Innumerable (A) None Seen, 0-5, 5-55, 5-65 /hpf    Epithelial Cells Occasional None Seen, Occasional /hpf    Bacteria, UA Occasional None Seen, Occasional /hpf    Hyaline Casts, UA 10-25 (A) None Seen /lpf   Platelet count    Collection Time: 11/10/17 10:22 PM   Result Value Ref Range    Platelets 880 543 - 647 Thousands/uL    MPV 10 4 8 9 - 12 7 fL   Fingerstick Glucose (POCT)    Collection Time: 11/11/17  3:15 AM   Result Value Ref Range    POC Glucose 88 65 - 140 mg/dl   Gamma GT    Collection Time: 11/11/17  5:08 AM   Result Value Ref Range    GGT 74 5 - 85 U/L   Magnesium    Collection Time: 11/11/17  5:08 AM   Result Value Ref Range    Magnesium 1 7 1 6 - 2 6 mg/dL   Phosphorus    Collection Time: 11/11/17  5:08 AM   Result Value Ref Range    Phosphorus 3 4 2 3 - 4 1 mg/dL   CBC (With Platelets)    Collection Time: 11/11/17  5:08 AM   Result Value Ref Range    WBC 5 69 4 31 - 10 16 Thousand/uL    RBC 3 91 3 81 - 5 12 Million/uL    Hemoglobin 12 4 11 5 - 15 4 g/dL    Hematocrit 36 5 34 8 - 46 1 %    MCV 93 82 - 98 fL    MCH 31 7 26 8 - 34 3 pg    MCHC 34 0 31 4 - 37 4 g/dL    RDW 14 4 11 6 - 15 1 %    Platelets 983 092 - 235 Thousands/uL    MPV 10 4 8 9 - 12 7 fL   Basic metabolic panel    Collection Time: 11/11/17  5:08 AM   Result Value Ref Range    Sodium 135 (L) 136 - 145 mmol/L    Potassium 3 5 3 5 - 5 3 mmol/L    Chloride 99 (L) 100 - 108 mmol/L    CO2 25 21 - 32 mmol/L    Anion Gap 11 4 - 13 mmol/L    BUN 47 (H) 5 - 25 mg/dL    Creatinine 1 77 (H) 0 60 - 1 30 mg/dL    Glucose 92 65 - 140 mg/dL    Calcium 8 9 8 3 - 10 1 mg/dL    eGFR 23 ml/min/1 73sq m       Radiology Results: I have personally reviewed pertinent reports  Other Diagnostic Testing:   I have personally reviewed pertinent reports          Active Meds:   Current Facility-Administered Medications   Medication Dose Route Frequency    acetaminophen (TYLENOL) tablet 650 mg  650 mg Oral Q6H PRN    allopurinol (ZYLOPRIM) tablet 100 mg  100 mg Oral Daily    amLODIPine (NORVASC) tablet 5 mg  5 mg Oral Daily    furosemide (LASIX) tablet 20 mg  20 mg Oral Daily    heparin (porcine) subcutaneous injection 5,000 Units  5,000 Units Subcutaneous Q8H [address]    levothyroxine tablet 75 mcg  75 mcg Oral Early Morning    metoprolol tartrate (LOPRESSOR) tablet 100 mg  100 mg Oral Q12H KENY    pantoprazole (PROTONIX) EC tablet 40 mg  40 mg Oral Daily         VTE Pharmacologic Prophylaxis: Heparin  VTE Mechanical Prophylaxis: sequential compression device    Abi Zhou DO

## 2017-11-11 NOTE — PROGRESS NOTES
Pt refused MRSA nasal swab and said "i will worry about it tomorrow I just want to sleep " vitals stable, denies pain

## 2017-11-11 NOTE — PROGRESS NOTES
Upon assessment, pt confused and not oriented to person, place, and time  Needed to command 5x before she follow commands  When asked what her name is, she said "Selvin Gomez", which is wrong  SOD paged and came at bedside  Patient did not recognize her grandson at bedside  NO droops noted, slight garbled speech, tongue midline, denies pain, dizziness, nausea

## 2017-11-11 NOTE — H&P
INTERNAL MEDICINE HISTORY AND PHYSICAL  [facility] 721-01 SOD Team C     NAME: Hemalatha Medellin  AGE: 80 y o  SEX: female  : 1916   MRN: [id number]  ENCOUNTER: [id number]    DATE: 11/10/2017  TIME: 10:06 PM    Primary Care Physician: Ramon Gilman DO  Admitting Provider: Abilio Amos MD    Chief complaint: Productive Cough     History of Present Illness     Avtar Hart is a 80 y o  female with past medical persistent atrial fibrillation, hypothyroidism, stage 4 CKD, essential hypertension, severe protein calorie malnutrition, systolic heart failure with EF 30% , 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 presents with 1 month duration productive cough  Patient developed cough 1 month ago  She is currently a resident at [facility]  She notes that she is coughing all throughout the day productive of sputum but she cannot describe the color or consistency  Patient's granddaughter reports possible sick contacts  She notes she had bronchitis about 1 month ago as well as her children had viral infections over this last month and have had contact with her grandmother  Patient denies any fevers, chills, shortness of breath, abdominal pain, constipation, diarrhea, nausea, vomiting, or dysuria  She uses a wheelchair to ambulate and have not noticed any dyspnea on exertion  She does not require oxygen at baseline  She denies pillow orthopnea or swelling of her lower extremities  Due to this chronic cough, nursing staff performed physical exam on patient finding "crackles" in lungs which prompted admission to emergency room  Patient was  recently admitted 2017 to 2017 for acute bleed from colostomy, acute respiratory failure with hypoxia and right-sided pleural effusion  GI bleed was found to be superficial   Shortness of breath revealed right-sided pleural effusion which was treated medically with diuresis due to patient refusing Thora centesis    She also developed AFib with RVR and her metoprolol was increased  Patient not on AC due to history of vaginal bleeding  Poor chronic renal failure, Nephrology manager and acute on chronic renal failure thought to be most likely cardiorenal   Additionally, had symptomatic UTI treated with Rocephin  On presentation to ED patient's temperature 98 2°, pulse 109, respirations 24, /80, oxygen saturation 94% on 3 L nasal cannula  Initial labs show sodium 134, BUN 49/creatinine 1 73, alk-phos 166, albumin 2 4, CBC-within normal limits, lactic acid 1 3, INR 1 15, UA-large blood, small leukocytes, protein, 50 RBC, innumerable white blood cell  Chest x-ray shows new moderate sized right pleural effusion, stable small left pleural effusion, underlying atelectasis or infiltrate is not excluded  Patient was given IV vancomycin and cefepime in emergency room for presumed pneumonia  On physical exam, patient was evaluated off nasal cannula in sitting and supine position  Patient desaturated to low 80s when in supine position  Review of Systems   Review of Systems   Constitutional: Negative for activity change, appetite change, chills and fever  HENT: Negative for congestion, postnasal drip, rhinorrhea, sore throat and trouble swallowing  Eyes: Negative for photophobia and visual disturbance  Respiratory: Positive for cough (productive)  Negative for chest tightness and shortness of breath  Cardiovascular: Negative for chest pain, palpitations and leg swelling  Gastrointestinal: Negative for abdominal distention, abdominal pain, constipation, diarrhea, nausea and vomiting  Genitourinary: Negative for difficulty urinating, dysuria, frequency and hematuria  Neurological: Negative for dizziness, speech difficulty, light-headedness and headaches         Past Medical History     Past Medical History:   Diagnosis Date    Bowel perforation ([facility] [address] )     Cataract     H/O colostomy     Hiatal hernia     Kidney failure     PVD (peripheral vascular disease) [address] [address]        Past Surgical History     Past Surgical History:   Procedure Laterality Date    CARDIAC PACEMAKER PLACEMENT      CARDIAC PACEMAKER PLACEMENT      CHOLECYSTECTOMY      COLOSTOMY         Social History     History   Alcohol Use No     History   Drug Use No     History   Smoking Status    Never Smoker   Smokeless Tobacco    Never Used       Family History   History reviewed  No pertinent family history  Medications Prior to Admission     Prior to Admission medications    Medication Sig Start Date End Date Taking? Authorizing Provider   allopurinol (ZYLOPRIM) 100 mg tablet Take 100 mg by mouth daily   Yes Historical Provider, MD   amLODIPine (NORVASC) 5 mg tablet Take 5 mg by mouth daily   Yes Historical Provider, MD   furosemide (LASIX) 20 mg tablet Take 1 tablet by mouth daily 9/20/17  Yes Manolo Curtis MD   levofloxacin (LEVAQUIN) 500 mg tablet Take 750 mg by mouth every 24 hours   Yes Historical Provider, MD   levothyroxine 75 mcg tablet Take 75 mcg by mouth daily   Yes Historical Provider, MD   metoprolol tartrate (LOPRESSOR) 100 mg tablet Take 1 tablet by mouth every 12 (twelve) hours 9/20/17  Yes Manolo Curtis MD   pantoprazole (PROTONIX) 40 mg tablet Take 40 mg by mouth daily   Yes Historical Provider, MD   potassium chloride (K-DUR,KLOR-CON) 10 mEq tablet Take 1 tablet by mouth daily 9/21/17  Yes Manolo Curtis MD       Allergies     Allergies   Allergen Reactions    Ace Inhibitors      Family and pt unsure       Objective     Vitals:    11/10/17 1750 11/10/17 1800 11/10/17 1830 11/1933   BP: 141/74 134/86 144/86 144/96   Pulse: (!) 108 (!) 110 100 (!) 115   Resp: 20 20 20 20   Temp:    98 °F (36 7 °C)   TempSrc:    Oral   SpO2: 94% 92% 93% 93%   Weight:       Height:         Body mass index is 17 38 kg/m²    No intake or output data in the 24 hours ending 11/10/17 2206  Invasive Devices     Peripheral Intravenous Line Peripheral IV 11/10/17 Left Arm less than 1 day          Drain            Colostomy LUQ 3346 days                Physical Exam  Physical Exam   Constitutional: She is oriented to person, place, and time  No distress  Thin, frail appearing    HENT:   Head: Normocephalic and atraumatic  Mouth/Throat: Oropharynx is clear and moist  No oropharyngeal exudate  Eyes: Conjunctivae and EOM are normal  Pupils are equal, round, and reactive to light  No scleral icterus  Cardiovascular: Exam reveals no friction rub  No murmur heard  Irregularly irregular    Pulmonary/Chest:   Decreased lung sound in right lung base, faint crackles in left lung base    Abdominal: Soft  Bowel sounds are normal  She exhibits no distension  There is no tenderness  There is no guarding  Musculoskeletal: She exhibits no edema, tenderness or deformity  Neurological: She is alert and oriented to person, place, and time  Skin: Skin is warm and dry  Capillary refill takes less than 2 seconds  She is not diaphoretic  Psychiatric: She has a normal mood and affect  Her behavior is normal  Judgment and thought content normal      Lab Results: I have personally reviewed pertinent reports      CBC:     Results from last 7 days  Lab Units 11/10/17  1530   WBC Thousand/uL 7 34   RBC Million/uL 4 18   HEMOGLOBIN g/dL 13 4   HEMATOCRIT % 38 9   MCV fL 93   MCH pg 32 1   MCHC g/dL 34 4   RDW % 14 5   MPV fL 10 8   PLATELETS Thousands/uL 280   NRBC AUTO /100 WBCs 0   NEUTROS PCT % 67   LYMPHS PCT % 23   MONOS PCT % 9   EOS PCT % 1   BASOS PCT % 0   NEUTROS ABS Thousands/µL 4 86   LYMPHS ABS Thousands/µL 1 72   MONOS ABS Thousand/µL 0 65   EOS ABS Thousand/µL 0 06   , Chemistry Profile:     Results from last 7 days  Lab Units 11/10/17  1657   SODIUM mmol/L 134*   POTASSIUM mmol/L 4 3   CHLORIDE mmol/L 100   CO2 mmol/L 24   ANION GAP mmol/L 10   BUN mg/dL 49*   CREATININE mg/dL 1 73*   GLUCOSE RANDOM mg/dL 101   CALCIUM mg/dL 8 8   AST U/L 25   ALT U/L 17   ALK PHOS U/L 166*   TOTAL PROTEIN g/dL 6 8   ALBUMIN g/dL 2 4*   BILIRUBIN TOTAL mg/dL 0 71   EGFR ml/min/1 73sq m 24   , Coagulation Studies:     Results from last 7 days  Lab Units 11/10/17  1530   PROTIME seconds 14 7*   INR  1 15   PTT seconds 31   , Cardiac Studies:   , Additional Labs:     Results from last 7 days  Lab Units 11/10/17  1530   LACTIC ACID mmol/L 1 3   , iSTAT CHEM 8:     Results from last 7 days  Lab Units 11/10/17  1657 11/10/17  1530   EGFR ml/min/1 73sq m 24  --    GLUCOSE RANDOM mg/dL 101  --    HEMOGLOBIN g/dL  --  13 4   , ABG:   , Toxicology:   , Last A1C/Lipid Panel/Thyroid Panel: No results found for: HGBA1C, TRIG, CHOL, HDL, LDLCALC, [id number], T3FREE, J3JPRMY, FREET4    Imaging: I have personally reviewed pertinent films in PACS  Xr Chest Pa & Lateral    Result Date: 11/10/2017  Narrative: CHEST INDICATION:  pneumonia  History taken directly from the electronic ordering system  COMPARISON:  Chest x-ray 2/25/2010 VIEWS:  Frontal and lateral projections IMAGES:  2 FINDINGS:  Dual-lead pacemaker is noted, leads overlie the right right ventricle  The heart is enlarged  There is a new moderate size right pleural effusion  Small left pleural effusion appears stable  Underlying atelectasis or infiltrate is not excluded  Age-appropriate degenerative changes are noted in the spine  Osseous structures appear otherwise within normal limits  Impression: New moderate sized right pleural effusion  Stable small left pleural effusion  Underlying atelectasis or infiltrate is not excluded   ##sigslh##sigslh  Workstation performed: [id number]       Microbiology: cultures obtained in emergency department include     Urinalysis:     Results from last 7 days  Lab Units 11/10/17  1711   COLOR UA  Yellow   CLARITY UA  Clear   SPEC GRAV UA  1 020   PH UA  6 0   LEUKOCYTES UA  Small*   NITRITE UA  Negative   PROTEIN UA mg/dl >=300*   GLUCOSE UA mg/dl Negative   KETONES UA mg/dl Negative BILIRUBIN UA  Negative   BLOOD UA  Large*        Urine Micro:     Results from last 7 days  Lab Units 11/10/17  1711   RBC UA /hpf 30-50*   WBC UA /hpf Innumerable*   EPITHELIAL CELLS WET PREP /hpf Occasional   BACTERIA UA /hpf Occasional        EKG, Pathology, and Other Studies: I have personally reviewed pertinent reports  Medications given in Emergency Department     Medication Administration - last 24 hours from 11/09/2017 2206 to 11/10/2017 2206       Date/Time Order Dose Route Action Action by     11/10/2017 1750 vancomycin (VANCOCIN) IVPB (premix) 500 mg 500 mg Intravenous New Bag Duane Lynn RN     11/10/2017 1746 cefepime (MAXIPIME) 2 g/50 mL dextrose IVPB 0 mg Intravenous Stopped Duane Lynn RN     11/10/2017 1708 cefepime (MAXIPIME) 2 g/50 mL dextrose IVPB 2,000 mg Intravenous New Bag Duane Lynn RN          Assessment and Plan     Patient is a 8 year old female with past medical history hypertension, systolic CHF, hypothyroidism, CKD 4, persistent atrial fibrillation who presents with acute hypoxic respiratory failure and asymptomatic bacteria  Severe Sepsis  Likely 2/2 to UTI vs Pneumonia   Patient met SIRS criteria with Tachycardia with respiratory rate 24/min  CXR with right sided pleural effusion and small left sided pleural effusion, with underlying pneumonia not ruled out   Given vancomycin and cefepime in ED  With clinical picture of 1 month productive cough, without fevers or leukocytosis and with CXR showing no definitive consolidation, pnuemonia not as likely  Will hold off antibiotics at this time and monitor clinically       Will follow up blood cultures, strep pneumonia and legionella antigens, MRSA culture    Acute Hypoxic Respiratory failure   Currently saturating low 90s on 3LNC, no oxygen requirement at baseline   Likely secondary to CHF exacerbation vs Pneumonia   CXR with b/l pleural effusions, cannot rule out atelectasis or infiltrate   BNP 67568  Given 10 mg IV lasix   Continue supplemental oxygenation with goal spO2 > 88%  Consider pulmonology consultation for right sided thoracentesis  Patient denied procedure on last admission  Chronic systolic heart failure  Most recent echocardiogram 9/16/2017 EF 30% with severe hypokinesis  Chest x-ray with right-sided pleural effusion, stable small left pleural effusion  BNP 38,000  Will give 10 mg IV lasix  Continue home Lasix 20 mg daily   Monitor I/Os   Continue Lopressor 100 mg q 12 hours  Not on ACEI due to allergy   Cardiac diet     Persistent atrial fibrillation  Patient rate controlled on Lopressor 100 mg b i d  No anticoagulation with history of vaginal bleeding    Asymptomatic Bacteriuria   UA with large blood, small leukocytes, protein, RBCs, innumerable WBC, occasional bacteria   On previous admission in 9/17 had symptomatic UTI with E  Coli sensitive treated with rocephin   No fever, leukocytosis, frequency, dysuria, suprapubic tenderness  Will hold off initiating anabiotics at this time   Will follow up urine culture     Hypertension  Well controlled on hospitalization  Continue home amlodipine 5 mg daily and Lopressor 100 mg b i d  Hypothyroidism  Will continue levothyroxine 75 mcg daily    CKD4  Cr 1 3, GFR 24  At baseline   Will continue to monitor     GERD  Will continue Protonix 40 mg daily    Gout  Continue allopurinol 100 mg daily     Severe protein calorie malnutrition  Thin, frail appearing   Albumin 2 4  Consider consult Nutrition for evaluation    Code Status: Level 3 - DNAR/DNI  VTE Pharmacologic Prophylaxis: Heparin   VTE Mechanical Prophylaxis: sequential compression device  Admission Status: INPATIENT     Admission Time  I spent 45 minutes admitting the patient  This involved direct patient contact where I performed a full history and physical, reviewing previous records, and reviewing laboratory and other diagnostic studies      Lavelle Nava DO  Internal Medicine  PGY-1

## 2017-11-11 NOTE — CONSULTS
Pulmonary Consultation   Anastasia Jules 80 y o  female MRN: [id number]  Unit/Bed#: [facility] 56-36 Encounter: [id number]      Reason for consultation:  Pleural effusion, acute hypoxic respiratory failure    Requesting physician: Dr Juanito Wu    Impressions/Recommendations:     · Hypoxia due to bilateral pleural effusions - titrate O2 for saturations above 88%     · Bilateral pleural effusions due to chronic systolic congestive heart failure with significant valvular disease and atrial fibrillation -   Continue with gentle diuresis as her blood pressure and renal function will tolerate  I briefly discussed possible thoracentesis with the patient and she states that she would not agree the to procedure at this time  Given her frail state, advanced age and patient's stated wishes, I would favor holding off on thoracentesis  Even if we do perform thoracentesis, with her underlying cardiac conditions, there is significant likelihood of fluid returning  We may need to accept some degree of pleural effusion  History of Present Illness   HPI:  Anastasia Jules is a 80 y o  female who has history of significant valvular disease, systolic congestive heart failure and chronic atrial fibrillation who was admitted yesterday with increasing shortness of breath  According to the H&P, patient had been suffering from cough for the past month  Patient currently denies any coughing or sputum production  On arrival, she was noted to be hypoxic with saturations in the 80s when lying in supine position  She is currently on 2 L via nasal cannula and saturations are 98%  She does not normally wear supplemental oxygen  She lives in a nursing home  Patient denies shortness of breath at this time  She denies abdominal pain, fever, chills or sweats  She denies lower extremity edema  We are asked to evaluate the patient due to findings of bilateral pleural effusions on chest x-ray       Review of systems:  Patient denies headache or vision changes  Weight is stable  Denies sore throat or runny nose  Denies fever, chills or sweats  Denies chest pain or palpitations  Denies nausea, vomiting or diarrhea  Denies lower extremity edema  Denies skin rashes  Denies dysuria or hematuria  All other 12-point review of systems are negative  Historical Information   Past Medical History:   Diagnosis Date    Bowel perforation ([facility] )     Cataract     H/O colostomy     Hiatal hernia     Kidney failure     PVD (peripheral vascular disease) ([facility] )      Past Surgical History:   Procedure Laterality Date    CARDIAC PACEMAKER PLACEMENT      CARDIAC PACEMAKER PLACEMENT      CHOLECYSTECTOMY      COLOSTOMY       History reviewed  No pertinent family history  Tobacco history:  Lifelong nonsmoker    Family history:  Noncontributory given advanced age    Meds/Allergies   Current Facility-Administered Medications   Medication Dose Route Frequency    acetaminophen (TYLENOL) tablet 650 mg  650 mg Oral Q6H PRN    allopurinol (ZYLOPRIM) tablet 100 mg  100 mg Oral Daily    amLODIPine (NORVASC) tablet 5 mg  5 mg Oral Daily    furosemide (LASIX) injection 20 mg  20 mg Intravenous BID (diuretic)    heparin (porcine) subcutaneous injection 5,000 Units  5,000 Units Subcutaneous Q8H [address]    levothyroxine tablet 75 mcg  75 mcg Oral Early Morning    metoprolol tartrate (LOPRESSOR) tablet 100 mg  100 mg Oral Q12H KENY    pantoprazole (PROTONIX) EC tablet 40 mg  40 mg Oral Daily     Allergies   Allergen Reactions    Ace Inhibitors      Family and pt unsure       Vitals: Blood pressure 116/67, pulse 98, temperature 98 °F (36 7 °C), temperature source Axillary, resp  rate 18, height 5' (1 524 m), weight 40 4 kg (89 lb), SpO2 98 %  , 2 L, Body mass index is 17 38 kg/m²      Intake/Output Summary (Last 24 hours) at 11/11/17 1102  Last data filed at 11/11/17 0139   Gross per 24 hour   Intake                0 ml   Output              750 ml Net             -750 ml       Physical exam:    General Appearance:    Frail, cachectic, chronically ill-appearing       Head/eyes:    Normocephalic, without obvious abnormality, atraumatic,         PERRL, extraocular muscles intact, no scleral icterus    Nose:   Nares normal, septum midline, no drainage    or sinus tenderness   Throat:   Dry mucous membranes, no thrush   Neck:   Supple, trachea midline, no adenopathy; no carotid    bruit or JVD   Lungs:     Decreased at the bases, no wheeze or rhonchi  Few scattered crackles on the left   Chest Wall:    No tenderness or deformity    Heart:    Irregular with systolic murmur   Abdomen:     Soft, non-tender, bowel sounds active all four quadrants,     no masses, no organomegaly   Extremities:   Extremities normal, atraumatic, no cyanosis or edema   Skin:   Warm, dry, turgor normal   Lymph nodes:   Cervical and supraclavicular nodes normal   Neurologic:   Generalized weakness     Labs: I have personally reviewed pertinent lab results        Results from last 7 days  Lab Units 11/11/17  0508 11/10/17  2222 11/10/17  1530   WBC Thousand/uL 5 69  --  7 34   HEMOGLOBIN g/dL 12 4  --  13 4   HEMATOCRIT % 36 5  --  38 9   PLATELETS Thousands/uL 264 251 280           Results from last 7 days  Lab Units 11/11/17  0508 11/10/17  1657   SODIUM mmol/L 135* 134*   POTASSIUM mmol/L 3 5 4 3   CHLORIDE mmol/L 99* 100   CO2 mmol/L 25 24   BUN mg/dL 47* 49*   CREATININE mg/dL 1 77* 1 73*   CALCIUM mg/dL 8 9 8 8   TOTAL PROTEIN g/dL  --  6 8   BILIRUBIN TOTAL mg/dL  --  0 71   ALK PHOS U/L  --  166*   ALT U/L  --  17   AST U/L  --  25   GLUCOSE RANDOM mg/dL 92 101       Results from last 7 days  Lab Units 11/10/17  1530   INR  1 15   PTT seconds 31       Results from last 7 days  Lab Units 11/11/17  0508   MAGNESIUM mg/dL 1 7     NT-BNP 38,192    Imaging and other studies: I have personally reviewed pertinent films in PACS chest x-ray performed yesterday shows bilateral, right greater than left pleural effusions    Other Studies: I have personally reviewed pertinent reports  Echocardiogram from September 2017 shows an EF of 30% with moderate hypokinesis of the anterior wall  RV systolic function is mildly reduced  Estimated peak PA pressure is 45 mm Hg  There is mild to moderate aortic insufficiency and moderate mitral regurgitation  Code Status: Level 3 - DNAR and DNI    Thank you for allowing us to participate in the care of your patient      Candace Ocampo, DO

## 2017-11-12 LAB
ANION GAP SERPL CALCULATED.3IONS-SCNC: 11 MMOL/L (ref 4–13)
BUN SERPL-MCNC: 45 MG/DL (ref 5–25)
CALCIUM SERPL-MCNC: 8.5 MG/DL (ref 8.3–10.1)
CHLORIDE SERPL-SCNC: 98 MMOL/L (ref 100–108)
CO2 SERPL-SCNC: 28 MMOL/L (ref 21–32)
CREAT SERPL-MCNC: 1.81 MG/DL (ref 0.6–1.3)
ERYTHROCYTE [DISTWIDTH] IN BLOOD BY AUTOMATED COUNT: 14.4 % (ref 11.6–15.1)
GFR SERPL CREATININE-BSD FRML MDRD: 23 ML/MIN/1.73SQ M
GLUCOSE SERPL-MCNC: 91 MG/DL (ref 65–140)
HCT VFR BLD AUTO: 35.2 % (ref 34.8–46.1)
HGB BLD-MCNC: 12.1 G/DL (ref 11.5–15.4)
MCH RBC QN AUTO: 32.1 PG (ref 26.8–34.3)
MCHC RBC AUTO-ENTMCNC: 34.4 G/DL (ref 31.4–37.4)
MCV RBC AUTO: 93 FL (ref 82–98)
PLATELET # BLD AUTO: 233 THOUSANDS/UL (ref 149–390)
PMV BLD AUTO: 10.5 FL (ref 8.9–12.7)
POTASSIUM SERPL-SCNC: 3.1 MMOL/L (ref 3.5–5.3)
RBC # BLD AUTO: 3.77 MILLION/UL (ref 3.81–5.12)
SODIUM SERPL-SCNC: 137 MMOL/L (ref 136–145)
WBC # BLD AUTO: 4.06 THOUSAND/UL (ref 4.31–10.16)

## 2017-11-12 PROCEDURE — 85027 COMPLETE CBC AUTOMATED: CPT | Performed by: INTERNAL MEDICINE

## 2017-11-12 PROCEDURE — 80048 BASIC METABOLIC PNL TOTAL CA: CPT | Performed by: INTERNAL MEDICINE

## 2017-11-12 RX ORDER — POTASSIUM CHLORIDE 20 MEQ/1
40 TABLET, EXTENDED RELEASE ORAL ONCE
Status: COMPLETED | OUTPATIENT
Start: 2017-11-12 | End: 2017-11-12

## 2017-11-12 RX ADMIN — HEPARIN SODIUM 5000 UNITS: 5000 INJECTION, SOLUTION INTRAVENOUS; SUBCUTANEOUS at 16:27

## 2017-11-12 RX ADMIN — AMLODIPINE BESYLATE 5 MG: 5 TABLET ORAL at 08:38

## 2017-11-12 RX ADMIN — LEVOTHYROXINE SODIUM 75 MCG: 75 TABLET ORAL at 05:51

## 2017-11-12 RX ADMIN — PANTOPRAZOLE SODIUM 40 MG: 40 TABLET, DELAYED RELEASE ORAL at 08:38

## 2017-11-12 RX ADMIN — POTASSIUM CHLORIDE 40 MEQ: 1500 TABLET, EXTENDED RELEASE ORAL at 16:28

## 2017-11-12 RX ADMIN — FUROSEMIDE 20 MG: 10 INJECTION, SOLUTION INTRAMUSCULAR; INTRAVENOUS at 08:38

## 2017-11-12 RX ADMIN — Medication 400 MG: at 16:31

## 2017-11-12 RX ADMIN — ALLOPURINOL 100 MG: 100 TABLET ORAL at 08:38

## 2017-11-12 RX ADMIN — Medication 400 MG: at 12:24

## 2017-11-12 RX ADMIN — METOPROLOL TARTRATE 100 MG: 50 TABLET ORAL at 08:38

## 2017-11-12 RX ADMIN — METOPROLOL TARTRATE 100 MG: 50 TABLET ORAL at 21:28

## 2017-11-12 RX ADMIN — POTASSIUM CHLORIDE 40 MEQ: 1500 TABLET, EXTENDED RELEASE ORAL at 12:24

## 2017-11-12 NOTE — PROGRESS NOTES
Nurse paged shortness of breath regarding patient being confused and not oriented to person, place, time  Patient was able to follow simple commands  Patient could not recognize her grand son  She was enjoying her meal at the time evaluation  Patient denied any headache, dizziness, chest pain, abdominal pain, shortness of breath, difficulty urinating  In fact she commended that she would like to use the restroom to urinate  The patient's confusion likely etiology was explained to the grandson who was at bedside  Physical Exam   Constitutional: No distress  HENT:   Head: Normocephalic and atraumatic  Right Ear: External ear normal    Left Ear: External ear normal    Eyes: EOM are normal  Pupils are equal, round, and reactive to light  Cardiovascular: Normal rate, regular rhythm and normal heart sounds  No murmur heard  Pulmonary/Chest: Effort normal and breath sounds normal  No respiratory distress  Abdominal: Soft  Bowel sounds are normal  She exhibits no distension  There is no tenderness  Musculoskeletal: She exhibits no edema  Neurological: She is alert  No cranial nerve deficit or sensory deficit  She exhibits normal muscle tone  AO x0  Could not recall family members name  Was not responding appropriately to questions and would rhyme her responses  As per grandson at bedside, patient appears more confused and not at her baseline  No focal neurological deficit  No facial deficits  Negative Babinski  Skin: Skin is warm and dry  No rash noted  She is not diaphoretic  Plan:  - UA however falsely negative possibly given recent vanc and cefepime  Low threshold for restarting antibiotics  - hold off on head CT for now    Continue monitoring for any further neurological changes  - follow-up CBC and BMP

## 2017-11-12 NOTE — PROGRESS NOTES
IM Residency Progress Note   Unit/Bed#: [facility] 721-01 Encounter: [id number]  SOD Team C       Helio Bourgeois 80 y o  female [id number]    Hospital Stay Days: 2      Assessment/Plan:  Acute hypoxic respiratory failure   - improved  Likely related to heart failure exacerbation & pleural effusion  - at home she is on 2 L nasal cannula as needed bed time  - patient is satting at 98%  on 2 L of oxygen by nasal cannula , will titrate down as tolerated    Pleural effusion  - she has a new moderate right-sided pleural effusion and a stable left-sided pleural effusion on chest x-ray  - Pulmonology recommendations appreciated:  Holding off on thoracentesis for now  -continue diuresis     Heart failure with reduced EF likely an acute exacerbation:    - last echo on September 16th, 2017 showed an EF of 30% with markedly reduced systolic function severe valvular disease and elevated peak pulmonary artery pressure of 45 mmHg  - Her NT proBNP was 38,192   -  continue with IV Lasix 20 mg twice daily  - continue with strict monitoring of intake and output, fluid and sodium restriction cardiac diet   - she is -1L since admission     Acute encephalopathy  -was noted to be with altered mental status on 11/11/2017  -likely sundowning  -avoid sedative, please pre orient  -patient awake alert and oriented x4 this morning, no neurologic deficits     Paroxysmal Atrial fibrillation rate controlled:    - Patient is currently rate controlled on Lopressor,   - she is not on any anticoagulation secondary to vaginal bleeding    - continue telemetry monitoring     Asymptomatic bacteriuria:    - urine culture no growth  - hold off antibiotics for now      CKD stage 4:    - creatinine this morning is 1 8 with a baseline of 1 6-1 9  - continue to avoid nephrotoxic agents      hypokalemia  Will replete     Hypertension:    - controlled blood pressure 145/78  - continue with amlodipine, metoprolol and Lasix      Hypothyroidism:   - continue with Synthroid  - will check TSH free T4     GERD:    - continue with pantoprazole     Gout:    - continue with allopurinol     Elevated alk-phos:   - alk-phos was 166 but GTT and total bilirubin were normal  - will continue to monitor liver function     Severe protein calorie malnutrition:   - will consult nutrition and continue with Ensure    -SIRS  Resolved    Disposition:   Continue to provide supplemental oxygen as needed  Continue diureses  Check electrolytes in the morning       Subjective:   Patient seen examined at bedside  Much improved from the respiratory point of view   Denies shortness of breath chest pain abdominal pain dysuria  Awake alert and oriented x4, she is ordering launch at this time  Vitals: Temp (24hrs), Av 6 °F (37 6 °C), Min:98 9 °F (37 2 °C), Max:100 2 °F (37 9 °C)  Current: Temperature: 98 9 °F (37 2 °C)  Vitals:    17 0739 17 1550 17 2139 17 0701   BP: 116/67 139/71 153/65 145/78   Pulse: 98 98 103 85   Resp: 18 18  18   Temp: 98 °F (36 7 °C) 100 2 °F (37 9 °C)  98 9 °F (37 2 °C)   TempSrc: Axillary Axillary  Oral   SpO2: 98% 96%  98%   Weight:       Height:        Body mass index is 17 38 kg/m²  I/O last 24 hours:   In: 56 [P O :370]  Out: 945 [Urine:845; Stool:100]      Physical Exam: /78   Pulse 85   Temp 98 9 °F (37 2 °C) (Oral)   Resp 18   Ht 5' (1 524 m)   Wt 40 4 kg (89 lb)   SpO2 98%   BMI 17 38 kg/m²     General Appearance:    Alert, cooperative, no distress, appears stated age   Head:    Normocephalic, without obvious abnormality, atraumatic   Eyes:    PERRL, conjunctiva/corneas clear, EOM's intact, fundi     benign, both eyes   Neck:   Supple, no JVD   Lungs:    Decreased breath sounds bilateral,  respirations unlabored    Heart:    Regular rate and rhythm, S1 and S2 normal, no murmur, rub   or gallop   Abdomen:     Soft, non-tender, bowel sounds active all four quadrants,     no masses, no organomegaly   Extremities: Extremities normal, atraumatic, no cyanosis or edema   Neurologic:   CNII-XII intact, normal strength, sensation and reflexes     throughout        Invasive Devices     Peripheral Intravenous Line            Peripheral IV 11/11/17 Right Forearm less than 1 day          Drain            Colostomy LUQ 3348 days                          Labs:   Recent Results (from the past 24 hour(s))   Urinalysis with microscopic    Collection Time: 11/11/17  6:41 PM   Result Value Ref Range    Clarity, UA Clear     Color, UA Yellow     Specific Gravity, UA 1 009 1 003 - 1 030    pH, UA 5 5 4 5 - 8 0    Glucose, UA Negative Negative mg/dl    Ketones, UA Negative Negative mg/dl    Blood, UA Small (A) Negative    Protein,  (2+) (A) Negative mg/dl    Nitrite, UA Negative Negative    Bilirubin, UA Negative Negative    Urobilinogen, UA 0 2 0 2, 1 0 E U /dl E U /dl    Leukocytes, UA Trace (A) Negative    WBC, UA 10-20 (A) None Seen, 0-5, 5-55, 5-65 /hpf    RBC, UA 2-4 (A) None Seen, 0-5 /hpf    Hyaline Casts, UA None Seen None Seen /lpf    Bacteria, UA None Seen None Seen, Occasional /hpf    Epithelial Cells Occasional None Seen, Occasional /hpf   CBC and differential    Collection Time: 11/11/17 10:38 PM   Result Value Ref Range    WBC 4 64 4 31 - 10 16 Thousand/uL    RBC 3 78 (L) 3 81 - 5 12 Million/uL    Hemoglobin 12 1 11 5 - 15 4 g/dL    Hematocrit 35 5 34 8 - 46 1 %    MCV 94 82 - 98 fL    MCH 32 0 26 8 - 34 3 pg    MCHC 34 1 31 4 - 37 4 g/dL    RDW 14 4 11 6 - 15 1 %    MPV 10 1 8 9 - 12 7 fL    Platelets 347 483 - 690 Thousands/uL    nRBC 0 /100 WBCs    Neutrophils Relative 54 43 - 75 %    Lymphocytes Relative 31 14 - 44 %    Monocytes Relative 12 4 - 12 %    Eosinophils Relative 3 0 - 6 %    Basophils Relative 0 0 - 1 %    Neutrophils Absolute 2 49 1 85 - 7 62 Thousands/µL    Lymphocytes Absolute 1 43 0 60 - 4 47 Thousands/µL    Monocytes Absolute 0 56 0 17 - 1 22 Thousand/µL    Eosinophils Absolute 0 14 0 00 - 0 61 Thousand/µL    Basophils Absolute 0 01 0 00 - 0 10 Thousands/µL   Basic metabolic panel    Collection Time: 11/11/17 10:38 PM   Result Value Ref Range    Sodium 137 136 - 145 mmol/L    Potassium 3 4 (L) 3 5 - 5 3 mmol/L    Chloride 99 (L) 100 - 108 mmol/L    CO2 28 21 - 32 mmol/L    Anion Gap 10 4 - 13 mmol/L    BUN 47 (H) 5 - 25 mg/dL    Creatinine 1 82 (H) 0 60 - 1 30 mg/dL    Glucose 95 65 - 140 mg/dL    Calcium 8 7 8 3 - 10 1 mg/dL    eGFR 22 ml/min/1 73sq m   Basic metabolic panel    Collection Time: 11/12/17  6:20 AM   Result Value Ref Range    Sodium 137 136 - 145 mmol/L    Potassium 3 1 (L) 3 5 - 5 3 mmol/L    Chloride 98 (L) 100 - 108 mmol/L    CO2 28 21 - 32 mmol/L    Anion Gap 11 4 - 13 mmol/L    BUN 45 (H) 5 - 25 mg/dL    Creatinine 1 81 (H) 0 60 - 1 30 mg/dL    Glucose 91 65 - 140 mg/dL    Calcium 8 5 8 3 - 10 1 mg/dL    eGFR 23 ml/min/1 73sq m   CBC    Collection Time: 11/12/17  6:20 AM   Result Value Ref Range    WBC 4 06 (L) 4 31 - 10 16 Thousand/uL    RBC 3 77 (L) 3 81 - 5 12 Million/uL    Hemoglobin 12 1 11 5 - 15 4 g/dL    Hematocrit 35 2 34 8 - 46 1 %    MCV 93 82 - 98 fL    MCH 32 1 26 8 - 34 3 pg    MCHC 34 4 31 4 - 37 4 g/dL    RDW 14 4 11 6 - 15 1 %    Platelets 520 536 - 522 Thousands/uL    MPV 10 5 8 9 - 12 7 fL       Radiology Results: I have personally reviewed pertinent reports  Other Diagnostic Testing:   I have personally reviewed pertinent reports          Active Meds:   Current Facility-Administered Medications   Medication Dose Route Frequency    acetaminophen (TYLENOL) tablet 650 mg  650 mg Oral Q6H PRN    allopurinol (ZYLOPRIM) tablet 100 mg  100 mg Oral Daily    amLODIPine (NORVASC) tablet 5 mg  5 mg Oral Daily    furosemide (LASIX) injection 20 mg  20 mg Intravenous BID (diuretic)    heparin (porcine) subcutaneous injection 5,000 Units  5,000 Units Subcutaneous Q8H [facility]    levothyroxine tablet 75 mcg  75 mcg Oral Early Morning    metoprolol tartrate (LOPRESSOR) tablet 100 mg  100 mg Oral Q12H [address]    pantoprazole (PROTONIX) EC tablet 40 mg  40 mg Oral Daily    potassium chloride (K-DUR,KLOR-CON) CR tablet 40 mEq  40 mEq Oral Once    potassium chloride (K-DUR,KLOR-CON) CR tablet 40 mEq  40 mEq Oral Once         VTE Pharmacologic Prophylaxis: Heparin  VTE Mechanical Prophylaxis: sequential compression device    Syed Valdez DO

## 2017-11-12 NOTE — PHYSICIAN ADVISOR
Current patient class: Inpatient  The patient is currently on Hospital Day: 2      The patient was admitted to the hospital at [phone] on 11/10/17 for the following diagnosis:  Weakness [R53 1]       There is documentation in the medical record of an expected length of stay of at least 2 midnights  The patient is therefore expected to satisfy the 2 midnight benchmark and given the 2 midnight presumption is appropriate for INPATIENT ADMISSION  Given this expectation of a satisfying stay, CMS instructs us that the patient is most often appropriate for inpatient admission under part A provided medical necessity is documented in the chart  After review of the relevant documentation, labs, vital signs and test results, the patient is appropriate for INPATIENT ADMISSION  Admission to the hospital as an inpatient is a complex decision making process which requires the practitioner to consider the patients presenting complaint, history and physical examination and all relevant testing  With this in mind, in this case, the patient was deemed appropriate for INPATIENT ADMISSION  After review of the documentation and testing available at the time of the admission I concur with this clinical determination of medical necessity  Rationale is as follows: The patient is a 80 yrs old Female who presented to the ED at 11/10/2017  2:20 PM with a chief complaint of Medical Problem - Re-Evaluation (Granddaughter "She was just diagnosised with pnemonia by [facility] yesterday  She has only been on antibiotics for one day but we want her looked at  She was diagnosised with a UTI a couple of weeks ago, and she has a cough, and she has a wound on her backside   We just want her reevaluated and actually monitored" )    The patients vitals on arrival were ED Triage Vitals   Temperature Pulse Respirations Blood Pressure SpO2   11/10/17 1424 11/10/17 1424 11/10/17 1424 11/10/17 1424 11/10/17 1424   98 2 °F (36 8 °C) (!) 109 (!) 24 139/80 94 %      Temp Source Heart Rate Source Patient Position - Orthostatic VS BP Location FiO2 (%)   11/10/17 1424 11/10/17 1530 11/10/17 1424 11/10/17 1424 --   Oral Monitor Lying Right arm       Pain Score       11/10/17 1424       5           Past Medical History:   Diagnosis Date    Bowel perforation (HCC)     Cataract     H/O colostomy     Hiatal hernia     Kidney failure     PVD (peripheral vascular disease) (HCC)      Past Surgical History:   Procedure Laterality Date    CARDIAC PACEMAKER PLACEMENT      CARDIAC PACEMAKER PLACEMENT      CHOLECYSTECTOMY      COLOSTOMY             Consults have been placed to:   IP CONSULT TO PULMONOLOGY    Vitals:    11/10/17 2328 11/11/17 0739 11/11/17 1550 11/11/17 2139   BP: 120/63 116/67 139/71 153/65   Pulse: 97 98 98 103   Resp: 16 18 18    Temp: 98 1 °F (36 7 °C) 98 °F (36 7 °C) 100 2 °F (37 9 °C)    TempSrc: Oral Axillary Axillary    SpO2: 92% 98% 96%    Weight:       Height:           Most recent labs:    Recent Labs      11/10/17   1530  11/10/17   1657   11/11/17   2238   WBC  7 34   --    < >  4 64   HGB  13 4   --    < >  12 1   HCT  38 9   --    < >  35 5   PLT  280   --    < >  243   K   --   4 3   < >  3 4*   NA   --   134*   < >  137   CALCIUM   --   8 8   < >  8 7   BUN   --   49*   < >  47*   CREATININE   --   1 73*   < >  1 82*   INR  1 15   --    --    --    AST   --   25   --    --    ALT   --   17   --    --    ALKPHOS   --   166*   --    --    BILITOT   --   0 71   --    --     < > = values in this interval not displayed         Scheduled Meds:  allopurinol 100 mg Oral Daily   amLODIPine 5 mg Oral Daily   furosemide 20 mg Intravenous BID (diuretic)   heparin (porcine) 5,000 Units Subcutaneous Q8H [address]   levothyroxine 75 mcg Oral Early Morning   metoprolol tartrate 100 mg Oral Q12H KENY   pantoprazole 40 mg Oral Daily     Continuous Infusions:   PRN Meds:   acetaminophen    Surgical procedures (if appropriate):

## 2017-11-12 NOTE — PROGRESS NOTES
Found patient with blood  Pt took out her own IV site and it's bleeding  when asked why she took out her IV she said, "I dont know, why, I'm alright " cleaned pt, bed sheet changed  inserted new IV site

## 2017-11-13 VITALS
DIASTOLIC BLOOD PRESSURE: 74 MMHG | WEIGHT: 89 LBS | TEMPERATURE: 98 F | RESPIRATION RATE: 18 BRPM | SYSTOLIC BLOOD PRESSURE: 131 MMHG | HEART RATE: 98 BPM | OXYGEN SATURATION: 93 % | BODY MASS INDEX: 17.47 KG/M2 | HEIGHT: 60 IN

## 2017-11-13 PROBLEM — J96.01 ACUTE RESPIRATORY FAILURE WITH HYPOXIA (HCC): Status: RESOLVED | Noted: 2017-09-15 | Resolved: 2017-11-13

## 2017-11-13 PROBLEM — I50.23 ACUTE ON CHRONIC SYSTOLIC HEART FAILURE (HCC): Status: ACTIVE | Noted: 2017-11-13

## 2017-11-13 PROBLEM — I50.23 ACUTE ON CHRONIC SYSTOLIC HEART FAILURE (HCC): Status: RESOLVED | Noted: 2017-11-13 | Resolved: 2017-11-13

## 2017-11-13 PROBLEM — J96.10 CHRONIC RESPIRATORY FAILURE (HCC): Chronic | Status: ACTIVE | Noted: 2017-11-13

## 2017-11-13 PROBLEM — I50.20 SYSTOLIC HEART FAILURE (HCC): Chronic | Status: ACTIVE | Noted: 2017-11-13

## 2017-11-13 LAB
T4 FREE SERPL-MCNC: 1.19 NG/DL (ref 0.76–1.46)
TSH SERPL DL<=0.05 MIU/L-ACNC: 8.24 UIU/ML (ref 0.36–3.74)

## 2017-11-13 PROCEDURE — 84443 ASSAY THYROID STIM HORMONE: CPT | Performed by: INTERNAL MEDICINE

## 2017-11-13 PROCEDURE — 84439 ASSAY OF FREE THYROXINE: CPT | Performed by: INTERNAL MEDICINE

## 2017-11-13 RX ORDER — FUROSEMIDE 20 MG/1
20 TABLET ORAL 2 TIMES DAILY
Qty: 30 TABLET | Refills: 0 | Status: SHIPPED | OUTPATIENT
Start: 2017-11-13

## 2017-11-13 RX ORDER — POTASSIUM CHLORIDE 750 MG/1
10 TABLET, EXTENDED RELEASE ORAL DAILY
Qty: 20 TABLET | Refills: 0 | Status: SHIPPED | OUTPATIENT
Start: 2017-11-13

## 2017-11-13 RX ADMIN — LEVOTHYROXINE SODIUM 75 MCG: 75 TABLET ORAL at 04:51

## 2017-11-13 RX ADMIN — Medication 400 MG: at 08:07

## 2017-11-13 RX ADMIN — METOPROLOL TARTRATE 100 MG: 50 TABLET ORAL at 08:06

## 2017-11-13 RX ADMIN — ALLOPURINOL 100 MG: 100 TABLET ORAL at 08:07

## 2017-11-13 RX ADMIN — FUROSEMIDE 20 MG: 10 INJECTION, SOLUTION INTRAMUSCULAR; INTRAVENOUS at 08:08

## 2017-11-13 RX ADMIN — AMLODIPINE BESYLATE 5 MG: 5 TABLET ORAL at 08:07

## 2017-11-13 RX ADMIN — PANTOPRAZOLE SODIUM 40 MG: 40 TABLET, DELAYED RELEASE ORAL at 08:06

## 2017-11-13 NOTE — DISCHARGE SUMMARY
[facility] Discharge Summary - Medical Edwin Godwin 80 y o  female MRN: [id number]    [address] [address] Room / Bed: [facility] 721/[facility] 721-01 Encounter: [id number]    BRIEF OVERVIEW    Admitting Provider: Donaldo Curtis MD  Discharge Provider: Donaldo Curtis MD  Primary Care Physician at Discharge: Yousuf Christian MD    Discharge To:  [address] [place] [address],[address] / Family Member Name:  JOSESITO CARROLL MEM [facility]    Admission Date: 11/10/2017     Discharge Date: 11/13/2017      Primary Discharge Diagnosis  Principal Problem (Resolved):    Acute on chronic systolic heart failure ([facility] )  Active Problems:    Atrial fibrillation, persistent (HCC)    Pacemaker    Hypothyroidism    UTI (urinary tract infection)    Stage 4 chronic kidney disease ([facility] )    Essential hypertension    Pleural effusion, right    Severe protein-calorie malnutrition ([facility] )    Chronic respiratory failure (HCC)    Systolic heart failure ([facility] )  Resolved Problems:    Acute respiratory failure with hypoxia ([facility] )    Consulting Providers   Rose Leon,        Therapeutic Operative Procedures Performed  none    Diagnostic Procedures Performed  Xr Chest Pa & Lateral    Result Date: 11/10/2017  Impression: New moderate sized right pleural effusion  Stable small left pleural effusion  Underlying atelectasis or infiltrate is not excluded   ##sigslh##sigslh  Workstation performed: [id number]       Discharge Disposition: Released to SNF/TCU/SNU Facility  Discharged With Lines: no    Test Results Pending at Discharge: none    Outpatient Follow-Up  yes, with primary care physician within 1 to 2 weeks after discharge  Follow up with consulting providers  none required   Active Issues Requiring Follow-up   none    Code Status: Level 3 - DNAR and DNI  Advance Directive and Living Will: <no information>  Power of :    POLST:      Medications   Scheduled Meds:  allopurinol 100 mg Oral Daily   amLODIPine 5 mg Oral Daily furosemide 20 mg Oral BID (diuretic)   levothyroxine 75 mcg Oral Early Morning   magnesium oxide 400 mg Oral BID   metoprolol tartrate 100 mg Oral Q12H [facility]   Potassium  10 mEq Daily   pantoprazole 40 mg Oral Daily     Allergies  Allergies   Allergen Reactions    Ace Inhibitors      Family and pt unsure     Discharge Diet: regular diet  Activity restrictions: As tolerated    [address]  is a 80 y o  female with past medical persistent atrial fibrillation, hypothyroidism, stage 4 CKD, essential hypertension, severe protein calorie malnutrition, systolic heart failure with EF 30% , 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  Who  presented with 1 month worsening  productive cough  On initial evaluation patient was afebrile but tachycardic and tachypnea, blood pressure was stable, her oxygen saturation initially was in the 80s and she require up to  3 L nasal cannula  Blood work show CBC-within normal limits, lactic acid 1 3  Creatinine at baseline  Chest x-ray shows new moderate sized right pleural effusion, stable small left pleural effusion, underlying atelectasis or infiltrate is not excluded  Patient was given IV vancomycin and cefepime in emergency room for presumed pneumonia, that was rule out after  workup   Pulmonology team was consulted for possible thoracentesis, although the patient refused to consent for the procedure  Her [place] [address], under was felt as her worsening shortness of breath and cough was related to an acute on chronic systolic heart failure exacerbation, her Lasix was transitioned to IV and he was increased to 20 mg twice daily with potassium supplements as well  She had a good diureses over the next couple days, and her hypoxic respiratory failure improve    The patient also had a history of paroxysmal atrial fibrillation her heart rate was controlled and she was already on beta-blocker although she was not anticoagulation due to the risk of falls  At some point the patient also presented with acute encephalopathy, under was felt as sundowning event, required Re orientation only, with recommendations to avoid sedative medication  PT and OT were consulted, although the patient is well known for being poorly engaged in physical therapy activities, an evaluation was deferred, although we strongly recommend to do new  attempt for  physical therapy at her facility  Other Problems Addressed:     Acute hypoxic respiratory failure   - improved  Likely related to heart failure exacerbation & pleural effusion  - at home she is on 2 L nasal cannula as needed bed time  - patient is satting at 98%  on 2 L of oxygen by nasal cannula     Pleural effusion  - she has a new moderate right-sided pleural effusion and a stable left-sided pleural effusion on chest x-ray    - Pulmonology recommendations appreciated:  Holding off on thoracentesis for now, since patient refused  -continue diuresis     Heart failure with reduced EF likely an acute exacerbation:    - last echo on September 16th, 2017 showed an EF of 30% with markedly reduced systolic function severe valvular disease and elevated peak pulmonary artery pressure of 45 mmHg  - Her NT proBNP was 38,192   -  transitioned to Lasix 20 mg twice daily and potassium supplement  - continue with strict monitoring of intake and output, fluid and sodium restriction cardiac diet        Acute encephalopathy  -was noted to be with altered mental status on 11/11/2017  -likely sundowning  -avoid sedative, please pre orient  -patient awake alert and oriented x4 this morning, no neurologic deficits     Paroxysmal Atrial fibrillation rate controlled:    - Patient is currently rate controlled on Lopressor,   - she is not on any anticoagulation secondary to vaginal bleeding       Asymptomatic bacteriuria:    - urine culture no growth  - hold off antibiotics     CKD stage 4:    - creatinine this morning is 1 8 with a baseline of 1 6-1 9  - continue to avoid nephrotoxic agents      hypokalemia  -repleted     Hypertension:    - controlled blood pressure 145/78  - continue with amlodipine, metoprolol and Lasix      Hypothyroidism:   - continue with Synthroid  - check TSH free T4 6 weeks outpatient     GERD:    - continue with pantoprazole     Gout:    - continue with allopurinol     Elevated alk-phos:   - alk-phos was 166 but GTT and total bilirubin were normal  -monitor liver functions out patient     Severe protein calorie malnutrition:   -  continue with Ensure     -SIRS  Resolved      Other Pertinent Test Results  None     Discharge Condition: stable      Discharge  Statement   I spent 45 minutes minutes discharging the patient  This time was spent on the day of discharge  I had direct contact with the patient on the day of discharge  Additional documentation is required if more than 30 minutes were spent on discharge

## 2017-11-13 NOTE — SOCIAL WORK
Spoke with pt and explained she is dc today  Pt states she does not want to go back to [facility] and states she wants to go home  Pt has not been OOB  Call to [facility] and spoke to Webjam and she states pt refuses to get OOB there as well  Call to pts sister Maximino Duke and she states that pt needs to go back to  bc there is no one to help her at home  I asked pt about going to another nursing home and she said she is not interested in that as well  I explained to pt that it is not safe for her to go home and then pt agreed to go back to  and attempt to do some rehab once she returns  Call back to  [phone] and spoke to Yobany Martinez in admissions and pt can return today  Spoke to Bharath Zhao at [facility] and she states they will  at [facility]  Eboni aware pt is on 02  Call to pts sister Maximino Duke and she is aware of dc  Spoke to nurse, Cb Connelly and she is aware and paged to resident with SOD-C and he is aware as well

## 2017-11-13 NOTE — DISCHARGE INSTRUCTIONS
Heart Failure   WHAT YOU NEED TO KNOW:   Heart failure (HF) is a condition that does not allow your heart to fill or pump properly  Not enough oxygen in your blood gets to your organs and tissues  HF can occur in the right side, the left side, or both lower chambers of your heart  HF is often caused by damage or injury to your heart  The damage may be caused by heart attack, other heart conditions, or high blood pressure  HF is a long-term condition that tends to get worse over time  It is important to manage your health to improve your quality of life  HF can be worsened by heavy alcohol use, smoking, diabetes that is not controlled, or obesity  DISCHARGE INSTRUCTIONS:   Call 911 if:   · You have any of the following signs of a heart attack:      ¨ Squeezing, pressure, or pain in your chest that lasts longer than 5 minutes or returns    ¨ Discomfort or pain in your back, neck, jaw, stomach, or arm     ¨ Trouble breathing    ¨ Nausea or vomiting    ¨ Lightheadedness or a sudden cold sweat, especially with chest pain or trouble breathing    Return to the emergency department if:   · You gain 3 or more pounds (1 4 kg) in a day, or more than your healthcare provider says you should  · Your heartbeat is fast, slow, or uneven all the time  Contact your healthcare provider if:   · You have symptoms of worsening HF:      ¨ Shortness of breath at rest, at night, or that is getting worse in any way     ¨ Weight gain of 5 or more pounds (2 2 kg) in a week     ¨ More swelling in your legs or ankles     ¨ Abdominal pain or swelling     ¨ More coughing     ¨ Loss of appetite     ¨ Feeling tired all the time    · You feel hopeless or depressed, or you have lost interest in things you used to enjoy  · You often feel worried or afraid  · You have questions or concerns about your condition or care  Medicines: You may  need any of the following:  · Medicines  may be needed to help regulate your heart rhythm   You may also need medicine to lower your blood pressure, and to get rid of extra fluids  · Take your medicine as directed  Contact your healthcare provider if you think your medicine is not helping or if you have side effects  Tell him of her if you are allergic to any medicine  Keep a list of the medicines, vitamins, and herbs you take  Include the amounts, and when and why you take them  Bring the list or the pill bottles to follow-up visits  Carry your medicine list with you in case of an emergency  Follow up with your healthcare provider or cardiologist within 2 weeks or as directed: You may need to return for other tests  You may need home health care  A healthcare provider will monitor your vital signs, weight, and make sure your medicines are working  Write down your questions so you remember to ask them during your visits  Go to cardiac rehab as directed:  Cardiac rehab is a program run by specialists who will help you safely strengthen your heart  The program includes exercise, relaxation, stress management, and heart-healthy nutrition  Healthcare providers will also make sure your medicines are helping to reduce your symptoms  Manage HF:   · Do not smoke  Nicotine and other chemicals in cigarettes and cigars can cause lung damage and make HF difficult to manage  Ask your healthcare provider for information if you currently smoke and need help to quit  E-cigarettes or smokeless tobacco still contain nicotine  Talk to your healthcare provider before you use these products  · Do not drink alcohol or take illegal drugs  Alcohol and drugs can worsen your symptoms quickly  · Weigh yourself every morning  Use the same scale, in the same spot  Do this after you use the bathroom, but before you eat or drink anything  Wear the same type of clothing  Do not wear shoes  Record your weight each day so you will notice any sudden weight gain  Swelling and weight gain are signs of fluid retention   If you are overweight, ask how to lose weight safely  · Check your blood pressure and heart rate every day  Ask for more information about how to measure your blood pressure and heart rate correctly  Ask what these numbers should be for you  · Manage any chronic health conditions you have  These include high blood pressure, diabetes, obesity, high cholesterol, metabolic syndrome, and COPD  You will have fewer symptoms if you manage these health conditions  Follow your healthcare provider's recommendations and follow up with him or her regularly  · Eat heart-healthy foods and limit sodium (salt)  An easy way to do this is to eat more fresh fruits and vegetables and fewer canned and processed foods  Replace butter and margarine with heart-healthy oils such as olive oil and canola oil  Other heart-healthy foods include walnuts, whole-grain breads, low-fat dairy products, beans, and lean meats  Fatty fish such as salmon and tuna are also heart healthy  Ask how much salt you can eat each day  Do not use salt substitutes  · Drink liquids as directed  You may need to limit the amount of liquids you drink if you retain fluid  Ask how much liquid to drink each day and which liquids are best for you  · Stay active  If you are not active, your symptoms are likely to worsen quickly  Walking, bicycling, and other types of physical activity help maintain your strength and improve your mood  Physical activity also helps you manage your weight  Work with your healthcare provider to create an exercise plan that is right for you  · Get vaccines as directed  Get a flu shot every year  You may also need the pneumonia vaccine  The flu and pneumonia can be severe for a person who has HF  Vaccines protect you from these infections  Join a support group:  Living with HF can be difficult  It may be helpful to talk with others who have HF   You may learn how to better manage your condition or get emotional support  For more information:   · [address]  [place] , [place]   Phone: 4- 736 - [phone]  Web Address: https://www strong com/  org   © 2017 [address] Information is for End User's use only and may not be sold, redistributed or otherwise used for commercial purposes  All illustrations and images included in CareNotes® are the copyrighted property of [employer] , [employer]  or Reinier Barillas  The above information is an  only  It is not intended as medical advice for individual conditions or treatments  Talk to your doctor, nurse or pharmacist before following any medical regimen to see if it is safe and effective for you

## 2017-11-13 NOTE — PROGRESS NOTES
IM Residency Progress Note   Unit/Bed#: [facility] 721-01 Encounter: [id number]  SOD Team C       Rod Stratton 80 y o  female [id number]    Hospital Stay Days: 3  Primary care doctor: Vincent Thomason DO   Patient is currently a resident at [facility]    Assessment/Plan:  Acute hypoxic respiratory failure   - improved  Likely related to heart failure exacerbation & pleural effusion  - at home she is on 2 L nasal cannula as needed bed time  - patient is satting at 98%  on 2 L of oxygen by nasal cannula    Pleural effusion  - she has a new moderate right-sided pleural effusion and a stable left-sided pleural effusion on chest x-ray  - Pulmonology recommendations appreciated:  Holding off on thoracentesis for now  -continue diuresis     Heart failure with reduced EF likely an acute exacerbation:    - last echo on September 16th, 2017 showed an EF of 30% with markedly reduced systolic function severe valvular disease and elevated peak pulmonary artery pressure of 45 mmHg  - Her NT proBNP was 38,192   -  continue with IV Lasix 20 mg twice daily  - continue with strict monitoring of intake and output, fluid and sodium restriction cardiac diet   - she is -1 8 L since admission     Acute encephalopathy  -was noted to be with altered mental status on 11/11/2017  -likely sundowning  -avoid sedative, please pre orient  -patient awake alert and oriented x4 this morning, no neurologic deficits     Paroxysmal Atrial fibrillation rate controlled:    - Patient is currently rate controlled on Lopressor,   - she is not on any anticoagulation secondary to vaginal bleeding    - continue telemetry monitoring     Asymptomatic bacteriuria:    - urine culture no growth  - hold off antibiotics for now      CKD stage 4:    - creatinine this morning is 1 8 with a baseline of 1 6-1 9  - continue to avoid nephrotoxic agents      hypokalemia  Will replete     Hypertension:    - controlled blood pressure 145/78  - continue with amlodipine, metoprolol and Lasix      Hypothyroidism:   - continue with Synthroid  - will check TSH free T4     GERD:    - continue with pantoprazole     Gout:    - continue with allopurinol     Elevated alk-phos:   - alk-phos was 166 but GTT and total bilirubin were normal  - will continue to monitor liver function     Severe protein calorie malnutrition:   - will consult nutrition and continue with Ensure    -SIRS  Resolved    Disposition:   Continue to provide supplemental oxygen as needed  Continue diureses  Check K in the AM      Subjective:   Patient seen examined at bedside  She is awake alert and oriented x4, very talkative this morning, breakfast is at bedside she states she is hungry, respiratory point of view is improving, she is using oxygen 2 L which is her baseline   Denies chest pain lightheadedness dizziness abdominal pain, a had bowel movement yesterday       Vitals: Temp (24hrs), Av 1 °F (36 7 °C), Min:97 9 °F (36 6 °C), Max:98 2 °F (36 8 °C)  Current: Temperature: 97 9 °F (36 6 °C)  Vitals:    17 0701 17 1543 17 2133 17 2331   BP: 145/78 107/56 115/85 123/57   Pulse: 85 87 80 99   Resp: 18 16  18   Temp: 98 9 °F (37 2 °C) 98 2 °F (36 8 °C)  97 9 °F (36 6 °C)   TempSrc: Oral Axillary  Oral   SpO2: 98% 100%  95%   Weight:       Height:        Body mass index is 17 38 kg/m²  I/O last 24 hours:   In: 1 [P O :384]  Out: 1100 [Urine:550; Stool:550]      Physical Exam: /57   Pulse 99   Temp 97 9 °F (36 6 °C) (Oral)   Resp 18   Ht 5' (1 524 m)   Wt 40 4 kg (89 lb)   SpO2 95%   BMI 17 38 kg/m²     General Appearance:    Alert, cooperative, no distress, appears stated age   Head:    Normocephalic, without obvious abnormality, atraumatic   Eyes:    PERRL, conjunctiva/corneas clear, EOM's intact, fundi     benign, both eyes   Neck:   Supple, no JVD   Lungs:    CTA BL,  respirations unlabored    Heart:    Regular rate and rhythm, S1 and S2 normal, no murmur, rub   or gallop Abdomen:     Soft, non-tender, bowel sounds active all four quadrants,     no masses, no organomegaly   Extremities:   Extremities normal, atraumatic, no cyanosis or edema   Neurologic:   CNII-XII intact, normal strength, sensation and reflexes     throughout        Invasive Devices     Peripheral Intravenous Line            Peripheral IV 11/11/17 Right Forearm 1 day          Drain            Colostomy LUQ 3349 days                          Labs:   No results found for this or any previous visit (from the past 24 hour(s))  Radiology Results: I have personally reviewed pertinent reports  Other Diagnostic Testing:   I have personally reviewed pertinent reports          Active Meds:   Current Facility-Administered Medications   Medication Dose Route Frequency    acetaminophen (TYLENOL) tablet 650 mg  650 mg Oral Q6H PRN    allopurinol (ZYLOPRIM) tablet 100 mg  100 mg Oral Daily    amLODIPine (NORVASC) tablet 5 mg  5 mg Oral Daily    furosemide (LASIX) injection 20 mg  20 mg Intravenous BID (diuretic)    heparin (porcine) subcutaneous injection 5,000 Units  5,000 Units Subcutaneous Q8H [address]    levothyroxine tablet 75 mcg  75 mcg Oral Early Morning    magnesium oxide (MAG-OX) tablet 400 mg  400 mg Oral BID    metoprolol tartrate (LOPRESSOR) tablet 100 mg  100 mg Oral Q12H [address]    pantoprazole (PROTONIX) EC tablet 40 mg  40 mg Oral Daily         VTE Pharmacologic Prophylaxis: Heparin  VTE Mechanical Prophylaxis: sequential compression device    Soha Aguilar DO

## 2017-11-13 NOTE — PROGRESS NOTES
Patients grandson Juancarlos De Oliveirater at bedside  Does not want patient to go back to [facility]  Would like to speak to case management  Georgi notified  Case management notified  Will speak to patient and grandson when they return to floor

## 2017-11-13 NOTE — PROGRESS NOTES
Progress Note - Pulmonary   Crissie Oar 80 y o  female MRN: [id number]  Unit/Bed#: [facility] 721-01 Encounter: [id number]      Assessment:  1  Acute hypoxic respiratory failure secondary to #2  2  Bilateral pleural effusions secondary to #3  3  Chronic systolic heart failure with significant valvular heart disease & Afib    Plan:  1  Continue with gentle diuresis as blood pressure & renal function allow  2  Pt has been refusing thoracentesis - even if procedure done, effusions will likely return due to her underlying cardiac issues  3  Titrate supplemental oxygen as able to keep saturations greater than or equal to 88%  4  Incentive spirometry Q1hr, OOB as able, increase activity as able    Subjective:   Mrs Medellin is seen laying in bed  She appears comfortable on 2LNC  She is requesting to use the bedpan  At present, she denies chest pain, resting shortness of breath, cough, sputum production, fevers or bronchospasm  She has no lower extremity edema  She was not using supplemental oxygen prior to admission  Objective:     Vitals: Blood pressure 123/57, pulse 99, temperature 97 9 °F (36 6 °C), temperature source Oral, resp  rate 18, height 5' (1 524 m), weight 40 4 kg (89 lb), SpO2 95 %  , 2LNC, Body mass index is 17 38 kg/m²  Intake/Output Summary (Last 24 hours) at 11/13/17 0925  Last data filed at 11/13/17 0601   Gross per 24 hour   Intake              264 ml   Output              950 ml   Net             -686 ml         Physical Exam  Gen: Awake, alert, oriented x 3, no acute distress, chronically ill appearing  HEENT: Mucous membranes moist, no oral lesions, no thrush, wearing O2 via NC  NECK: No accessory muscle use, JVP not elevated  Cardiac: Irregular, single S1, single S2, with III/VI KIKA, no rubs, no gallops  Lungs: Poor effort with decreased breath sounds throughout bilaterally  Few rales left base  No wheezes or rhonchi noted    Abdomen: normoactive bowel sounds, soft nontender, nondistended, no rebound or rigidity, no guarding  Extremities: no cyanosis, no clubbing, no edema    Labs: I have personally reviewed pertinent lab results  , ABG: No results found for: PHART, HCS1SIV, PO2ART, TFI0OOR, C4OXFDNJ, BEART, SOURCE, BNP: No results found for: BNP, CBC: No results found for: WBC, HGB, HCT, MCV, PLT, ADJUSTEDWBC, MCH, MCHC, RDW, MPV, NRBC, CMP: No results found for: NA, K, CL, CO2, ANIONGAP, BUN, CREATININE, GLUCOSE, CALCIUM, AST, ALT, ALKPHOS, PROT, ALBUMIN, BILITOT, EGFR, PT/INR: No results found for: PT, INR, Troponin: No results found for: TROPONINI     Imaging and other studies: I have personally reviewed pertinent films in PACS    PA & Lat CXR 11/10/17  FINDINGS:  Dual-lead pacemaker is noted, leads overlie the right right ventricle      The heart is enlarged      There is a new moderate size right pleural effusion  Small left pleural effusion appears stable  Underlying atelectasis or infiltrate is not excluded      Age-appropriate degenerative changes are noted in the spine  Osseous structures appear otherwise within normal limits      IMPRESSION:     New moderate sized right pleural effusion  Stable small left pleural effusion  Underlying atelectasis or infiltrate is not excluded      Nallely Hodge PA-C

## 2017-11-13 NOTE — SOCIAL WORK
Spoke to pts ayla Power [phone] and he is aware pt is ready for dc and states he is agreeable [facility] and has already put pt on a waiting list at [facility]  Spoke to [facility] with SOD C and he is aware pt is still clear to be dc

## 2017-11-16 LAB
BACTERIA BLD CULT: NORMAL
BACTERIA BLD CULT: NORMAL

## 2018-01-13 VITALS
WEIGHT: 105.25 LBS | DIASTOLIC BLOOD PRESSURE: 56 MMHG | HEIGHT: 60 IN | BODY MASS INDEX: 20.66 KG/M2 | SYSTOLIC BLOOD PRESSURE: 102 MMHG | HEART RATE: 76 BPM

## 2018-03-07 NOTE — PROGRESS NOTES
"  Discussion/Summary  Normal device function      Results/Data  Cardiac Device In Clinic 18Apr2017 03:02PM Neita Halsted     Test Name Result Flag Reference   MISCELLANEOUS COMMENT (Report)     WOUND CHECK: INCISION CLEAN AND DRY WITH EDGES APPROXIMATED; WOUND CARE AND RESTRICTIONS REVIEWED WITH PATIENT  DEVICE INTERROGATED IN THE [place] OFFICE: BATTERY VOLTAGE ADEQUATE  2,055 AHRS NOTED, LONGEST 22 HRS  95 1% OF TIME IN MODE SWITCH  NO ANTICOA DUE TO BLD  ALL AVAILABLE LEAD PARAMETERS WITHIN NORMAL LIMITS  NORMAL DEVICE FUNCTION  NC   Cardiac Electrophysiology Report      [id number] [id number]  pdf   DEVICE TYPE Pacemaker       Cardiac Electrophysiology Report 18Apr2017 03:02PM Neita Halsted     Test Name Result Flag Reference   Cardiac Electrophysiology Report      [id number]  pdf     Signatures   Electronically signed by : Pooja Ivy, ; Apr 18 2017 11:52AM EST                       (Author)    Electronically signed by : Eusebia Osorio DO;  Apr 28 2017  7:27PM EST                       (Author)    "